# Patient Record
Sex: MALE | Race: BLACK OR AFRICAN AMERICAN | Employment: OTHER | URBAN - METROPOLITAN AREA
[De-identification: names, ages, dates, MRNs, and addresses within clinical notes are randomized per-mention and may not be internally consistent; named-entity substitution may affect disease eponyms.]

---

## 2019-09-13 ENCOUNTER — OFFICE VISIT (OUTPATIENT)
Dept: FAMILY MEDICINE CLINIC | Age: 59
End: 2019-09-13

## 2019-09-13 VITALS
WEIGHT: 209.8 LBS | RESPIRATION RATE: 18 BRPM | BODY MASS INDEX: 27.8 KG/M2 | HEIGHT: 73 IN | OXYGEN SATURATION: 97 % | HEART RATE: 70 BPM | DIASTOLIC BLOOD PRESSURE: 82 MMHG | TEMPERATURE: 98.2 F | SYSTOLIC BLOOD PRESSURE: 134 MMHG

## 2019-09-13 DIAGNOSIS — E66.3 OVERWEIGHT (BMI 25.0-29.9): ICD-10-CM

## 2019-09-13 DIAGNOSIS — M50.13 CERVICAL DISC DISORDER WITH RADICULOPATHY OF CERVICOTHORACIC REGION: ICD-10-CM

## 2019-09-13 DIAGNOSIS — Z87.891 FORMER SMOKER, STOPPED SMOKING IN DISTANT PAST: ICD-10-CM

## 2019-09-13 DIAGNOSIS — Z78.9 ALCOHOL USE: ICD-10-CM

## 2019-09-13 DIAGNOSIS — Z76.89 ENCOUNTER TO ESTABLISH CARE: Primary | ICD-10-CM

## 2019-09-13 DIAGNOSIS — M47.812 CERVICAL SPONDYLOSIS WITHOUT MYELOPATHY: ICD-10-CM

## 2019-09-13 DIAGNOSIS — M10.9 GOUT INVOLVING TOE OF RIGHT FOOT, UNSPECIFIED CAUSE, UNSPECIFIED CHRONICITY: ICD-10-CM

## 2019-09-13 RX ORDER — COLCHICINE 0.6 MG/1
0.6 TABLET ORAL DAILY
COMMUNITY
End: 2021-10-12 | Stop reason: ALTCHOICE

## 2019-09-13 NOTE — PROGRESS NOTES
[address] [facility] Note      Subjective:     Chief Complaint   Patient presents with    New Patient     establish care     Macey Roman is a 64y.o. year old male who presents for evaluation of the following:      Establishment of Care:  Previous PCP: Dr. Gutierrez Me  Patient Care Team:  Teresita Montoya MD as PCP - General (Family Practice)   Dentist- Name Marco Reveal- Name Unknown       PMH:   Gout:   Location Right big toe  Tx: colchicine prn, last used 1 year ago  Denies current pain  Lab Results   Component Value Date/Time    Uric acid 6.8 06/15/2013 09:55 PM       Cervical Disc Disease:   S/p formaniotice c6-c7 c7-T1 discectomy microlaminaotiy  Pain imrpvoedn   Residual weakness in right pink finger    Overweight:  Diet: balanced- eating vegetable most days  Activity: None   Last Weight Metrics:  Weight Loss Metrics 9/13/2019 2/5/2016 10/21/2014 10/14/2014 9/26/2014 10/14/2013 6/15/2013   Today's Wt 209 lb 12.8 oz 212 lb 4.9 oz 199 lb 199 lb 204 lb 204 lb 12.9 oz 199 lb 1.2 oz   BMI 27.68 kg/m2 28.02 kg/m2 26.98 kg/m2 26.98 kg/m2 26.92 kg/m2 27.03 kg/m2 26.27 kg/m2       Depression Screen:   3 most recent PHQ Screens 9/13/2019   Little interest or pleasure in doing things Not at all   Feeling down, depressed, irritable, or hopeless Not at all   Total Score PHQ 2 0       Smoker? Former, previous use 1 pack per week. quit 20 year  Alcohol Use: 3 beers per day. Sometimes more on the weekend  - has cut down to reduce liquor intake      Acute Concerns:  None      Social:   Works  Self employed as   Lives with wife. Has no children        Review of Systems   Pertinent positives and negative per HPI. All other systems  reviewed are negative for a Comprehensive ROS (10+).        Past Medical History:   Diagnosis Date    Gout         Social History     Socioeconomic History    Marital status:      Spouse name: Not on file    Number of children: Not on file    Years of education: Not on file    Highest education level: Not on file   Occupational History    Not on file   Social Needs    Financial resource strain: Not on file    Food insecurity:     Worry: Not on file     Inability: Not on file    Transportation needs:     Medical: Not on file     Non-medical: Not on file   Tobacco Use    Smoking status: Former Smoker     Packs/day: 0.50     Years: 12.00     Pack years: 6.00     Last attempt to quit: 10/14/1998     Years since quittin.9    Smokeless tobacco: Never Used   Substance and Sexual Activity    Alcohol use: Yes     Alcohol/week: 31.0 standard drinks     Types: 31 Cans of beer per week     Comment: around 3 per days     Drug use: Never    Sexual activity: Not Currently   Lifestyle    Physical activity:     Days per week: Not on file     Minutes per session: Not on file    Stress: Not on file   Relationships    Social connections:     Talks on phone: Not on file     Gets together: Not on file     Attends Yazidism service: Not on file     Active member of club or organization: Not on file     Attends meetings of clubs or organizations: Not on file     Relationship status: Not on file    Intimate partner violence:     Fear of current or ex partner: Not on file     Emotionally abused: Not on file     Physically abused: Not on file     Forced sexual activity: Not on file   Other Topics Concern    Not on file   Social History Narrative    Not on file       Family History   Problem Relation Age of Onset    Diabetes Mother     Hypertension Mother    [facility] Gout Mother     Cancer Father         PROSTATE    Diabetes Father     Anesth Problems Neg Hx        Current Outpatient Medications   Medication Sig    colchicine 0.6 mg tablet Take 0.6 mg by mouth daily.  oxyCODONE-acetaminophen (PERCOCET) 5-325 mg per tablet Take 1 Tab by mouth every six (6) hours as needed for Pain. Max Daily Amount: 4 Tabs.      No current facility-administered medications for this visit. Objective:     Vitals:    09/13/19 0837   BP: 134/82   Pulse: 70   Resp: 18   Temp: 98.2 °F (36.8 °C)   TempSrc: Oral   SpO2: 97%   Weight: 209 lb 12.8 oz (95.2 kg)   Height: 6' 1\" (1.854 m)       Physical Examination:  General: Alert, cooperative, no distress, appears stated age. Eyes: Conjunctivae clear. PERRL, EOMs intact. Ears: Normal external ear canals both ears. Nose: Nares normal.   Mouth/Throat: Lips, mucosa, and tongue normal.   Neck: Supple, symmetrical, trachea midline, no adenopathy. No thyroid enlargement/tenderness/nodules  Respiratory: Breathing comfortably, in no acute respiratory distress. Clear to auscultation bilaterally. Normal inspiratory and expiratory ratio. Cardiovascular: Regular rate and rhythm, S1, S2 normal, no murmur, click, rub or gallop.   -Extremities no edema. Pulses 2+ and symmetric radial   Abdomen: Soft, non-tender, not distended. Bowel sounds normal. No masses or organomegaly. MSK: Extremities normal, atraumatic, no effusion. Gait steady and unassisted. Back symmetric, no curvature. ROM normal. No CVA tenderness. Skin: Skin color, texture, turgor normal. No rashes or lesions on exposed skin. Lymph nodes: Cervical, supraclavicular nodes normal.  Neurologic: CNII-XII intact. Strength 5/5 grossly. Sensation and reflexes normal throughout. Psychiatric: Affect appropriate. Mood euthymic. Thoughts logical. Speech volume and speed normal      No visits with results within 3 Month(s) from this visit. Latest known visit with results is:   Admission on 10/21/2014, Discharged on 10/22/2014   Component Date Value Ref Range Status    HIV-1 screen 10/21/2014 NONREACTIVE  NR   Final    Comment: The test result is interpreted as NEGATIVE for HIV-1 and HIV-2 antibodies. However, this does not exclude possible infection with HIV. Follow CDC Guidelines to inform the test subject of the test result and its interpretation.                            This assay has not been evaluated for  screening, cord blood specimens, and individuals less than 15years of age.  Hepatitis C virus Ab 10/21/2014 0.11  Index Final    Hep C  virus Ab Interp. 10/21/2014 NEGATIVE     Final    Comment:      Index          Interpretation                           <0.80. .............. Allayne Ariadna Negative                           >0.79 and <1.00. ... Allayne Ariadna Allayne Airadna Equivocal                           >0.99 and <11.00. .. Allayne Ariadna Allayne Ariadna Low Positive                           >10.99. ............. Allayne Ariadna High Positive    Hep C  virus Ab comment 10/21/2014 Method used is [facility]. Final    Hepatitis B surface Ag 10/21/2014 <0.10  Index Final    Hep B surface Ag Interp. 10/21/2014 NEGATIVE     Final    Sodium 10/22/2014 137  136 - 145 mmol/L Final    Potassium 10/22/2014 4.4  3.5 - 5.1 mmol/L Final    Chloride 10/22/2014 102  97 - 108 mmol/L Final    CO2 10/22/2014 29  21 - 32 mmol/L Final    Anion gap 10/22/2014 6  5 - 15 mmol/L Final    Glucose 10/22/2014 119* 65 - 100 mg/dL Final    BUN 10/22/2014 9  6 - 20 MG/DL Final    Creatinine 10/22/2014 0.77  0.45 - 1.15 MG/DL Final    BUN/Creatinine ratio 10/22/2014 12  12 - 20   Final    GFR est AA 10/22/2014 >60  >60 ml/min/1.73m2 Final    GFR est non-AA 10/22/2014 >60  >60 ml/min/1.73m2 Final    Comment: Estimated GFR is calculated using the IDMS-traceable Modification of Diet in Renal Disease (MDRD) Study equation, reported for both  Americans (GFRAA) and non- Americans (GFRNA), and normalized to 1.73m2 body surface area. The physician must decide which value applies to the patient. The MDRD study equation should only be used in individuals age 25 or older. It has not been validated for the following: pregnant women, patients with serious comorbid conditions, or on certain medications, or persons with extremes of body size, muscle mass, or nutritional status.     Calcium 10/22/2014 8.7  8.5 - 10.1 MG/DL Final    WBC 10/22/2014 11.5* 4.1 - 11.1 K/uL Final    RBC 10/22/2014 5.34  4.10 - 5.70 M/uL Final    HGB 10/22/2014 14.5  12.1 - 17.0 g/dL Final    HCT 10/22/2014 44.8  36.6 - 50.3 % Final    MCV 10/22/2014 83.9  80.0 - 99.0 FL Final    MCH 10/22/2014 27.2  26.0 - 34.0 PG Final    MCHC 10/22/2014 32.4  30.0 - 36.5 g/dL Final    RDW 10/22/2014 12.4  11.5 - 14.5 % Final    PLATELET 43/31/4379 340  150 - 400 K/uL Final    NEUTROPHILS 10/22/2014 90* 32 - 75 % Final    LYMPHOCYTES 10/22/2014 8* 12 - 49 % Final    MONOCYTES 10/22/2014 2* 5 - 13 % Final    EOSINOPHILS 10/22/2014 0  0 - 7 % Final    BASOPHILS 10/22/2014 0  0 - 1 % Final    ABS. NEUTROPHILS 10/22/2014 10.4* 1.8 - 8.0 K/UL Final    ABS. LYMPHOCYTES 10/22/2014 0.9  0.8 - 3.5 K/UL Final    ABS. MONOCYTES 10/22/2014 0.2  0.0 - 1.0 K/UL Final    ABS. EOSINOPHILS 10/22/2014 0.0  0.0 - 0.4 K/UL Final    ABS. BASOPHILS 10/22/2014 0.0  0.0 - 0.1 K/UL Final           Assessment/ Plan:   Diagnoses and all orders for this visit:    1. Encounter to establish care    2. Cervical spondylosis without myelopathy    3. Cervical disc disorder with radiculopathy of cervicothoracic region    4. Overweight (BMI 25.0-29.9)    5. Gout involving toe of right foot, unspecified cause, unspecified chronicity    6. Alcohol use    7. Former smoker, stopped smoking in distant past      PMH reviewed per orders. Previous records requested/ reviewed. Diet and lifestyle modification encouraged for weight loss. Gout chronic. Continue colchicine- would refill when needed. Negative depression screen. Educated patient on red flag symptoms to warrant return to clinic or emergency room visit. I have discussed the diagnosis with the patient and the intended plan as seen in the above orders. The patient has been offered or received an after-visit summary and questions were answered concerning future plans.   I have discussed medication side effects and warnings with the patient as well.           Follow-up and Dispositions    · Return in about 6 months (around 3/13/2020) for Physical exam.         Signed,    Farideh Mccormick MD  9/13/2019

## 2019-09-13 NOTE — PATIENT INSTRUCTIONS
When You Are Overweight: Care Instructions  Your Care Instructions    If you're overweight, your doctor may recommend that you make changes in your eating and exercise habits. Being overweight can lead to serious health problems, such as high blood pressure, heart disease, type 2 diabetes, and arthritis, or it can make these problems worse. Eating a healthy diet and being more active can help you reach and stay at a healthy weight. You don't have to make huge changes all at once. Start by making small changes in your eating and exercise habits. To lose weight, you need to burn more calories than you take in. You can do this by eating healthy foods in reasonable amounts and becoming more active every day. Follow-up care is a key part of your treatment and safety. Be sure to make and go to all appointments, and call your doctor if you are having problems. It's also a good idea to know your test results and keep a list of the medicines you take. How can you care for yourself at home? · Improve your eating habits. You'll be more successful if you work on changing one eating habit at a time. All foods, if eaten in moderation, can be part of healthy eating. Remember to:  ? Eat a variety of foods from each food group. Include grains, vegetables, fruits, dairy, and protein foods. ? Limit foods high in fat, sugar, and calories. ? Eat slowly. And don't do anything else, such as watch TV, while you are eating. ? Pay attention to portion sizes. Put your food on a smaller plate. ? Plan your meals ahead of time. You'll be less likely to grab something that's not as healthy. · Get active. Regular activity can help you feel better, have more energy, and burn more calories. If you haven't been active, start slowly. Start with at least 30 minutes of moderate activity on most days of the week. Then gradually increase the amount of activity. Try for 60 or 90 minutes a day, at least 5 days a week.  There are a lot of ways to fit activity into your life. You can:  ? Walk or bike to the store. Or walk with a friend, or walk the dog.  ? Mow the lawn, rake leaves, shovel snow, or do some gardening. ? Use the stairs instead of the elevator, at least for a few floors. · Change your thinking. Your thoughts have a lot to do with how you feel and what you do. When you're trying to reach a healthy weight, changing how you think about certain things may help. Here are some ideas:  ? Don't compare yourself to others. Healthy bodies come in all shapes and sizes. ? Pay attention to how hungry or full you feel. When you eat, be aware of why you're eating and how much you're eating. ? Focus on improving your health instead of dieting. Dieting almost never works over the long term. · Ask your doctor about other health professionals who can help you reach a healthy weight. ? A dietitian can help you make healthy changes in your diet. ? An exercise specialist or  can help you develop a safe and effective exercise program.  ? A counselor or psychiatrist can help you cope with issues such as depression, anxiety, or family problems that can make it hard to focus on reaching a healthy weight. · Get support from your family, your doctor, your friends, a support groupand support yourself. Where can you learn more? Go to http://florencia-balta.info/. Enter Z683 in the search box to learn more about \"When You Are Overweight: Care Instructions. \"  Current as of: March 28, 2019  Content Version: 12.1  © 1372-3017 [employer]. Care instructions adapted under license by [employer] (which disclaims liability or warranty for this information). If you have questions about a medical condition or this instruction, always ask your healthcare professional. [address] any warranty or liability for your use of this information.

## 2019-09-13 NOTE — PROGRESS NOTES
Chief Complaint   Patient presents with    New Patient     establish care     1. Have you been to the ER, urgent care clinic since your last visit? Hospitalized since your last visit? No    2. Have you seen or consulted any other health care providers outside of the [address] [place] [address] since your last visit? Include any pap smears or colon screening.  No

## 2019-11-26 DIAGNOSIS — M47.812 CERVICAL SPONDYLOSIS WITHOUT MYELOPATHY: Primary | ICD-10-CM

## 2020-03-13 ENCOUNTER — OFFICE VISIT (OUTPATIENT)
Dept: FAMILY MEDICINE CLINIC | Age: 60
End: 2020-03-13

## 2020-03-13 VITALS
HEIGHT: 73 IN | SYSTOLIC BLOOD PRESSURE: 127 MMHG | OXYGEN SATURATION: 97 % | WEIGHT: 211.2 LBS | TEMPERATURE: 98 F | HEART RATE: 93 BPM | DIASTOLIC BLOOD PRESSURE: 84 MMHG | BODY MASS INDEX: 27.99 KG/M2 | RESPIRATION RATE: 16 BRPM

## 2020-03-13 DIAGNOSIS — E11.9 TYPE 2 DIABETES MELLITUS WITHOUT COMPLICATION, WITHOUT LONG-TERM CURRENT USE OF INSULIN (HCC): ICD-10-CM

## 2020-03-13 DIAGNOSIS — Z00.00 ENCOUNTER FOR WELLNESS EXAMINATION: Primary | ICD-10-CM

## 2020-03-13 DIAGNOSIS — Z11.59 NEED FOR HEPATITIS C SCREENING TEST: ICD-10-CM

## 2020-03-13 DIAGNOSIS — Z12.5 SCREENING PSA (PROSTATE SPECIFIC ANTIGEN): ICD-10-CM

## 2020-03-13 DIAGNOSIS — E66.3 OVERWEIGHT (BMI 25.0-29.9): ICD-10-CM

## 2020-03-13 DIAGNOSIS — Z80.42 FAMILY HISTORY OF PROSTATE CANCER: ICD-10-CM

## 2020-03-13 DIAGNOSIS — Z87.891 FORMER SMOKER: ICD-10-CM

## 2020-03-13 DIAGNOSIS — R73.09 ELEVATED GLUCOSE LEVEL: ICD-10-CM

## 2020-03-13 NOTE — PROGRESS NOTES
[facility] Note      Subjective:     Chief Complaint   Patient presents with    Complete Physical     annual      Jim Amaya is a 64y.o. year old male who presents for evaluation of the following:        Overweight:  Diet: balanced- eating vegetable most days  Activity: None   Last Weight Metrics:  Weight Loss Metrics 3/13/2020 9/13/2019 2/5/2016 10/21/2014 10/14/2014 9/26/2014 10/14/2013   Today's Wt 211 lb 3.2 oz 209 lb 12.8 oz 212 lb 4.9 oz 199 lb 199 lb 204 lb 204 lb 12.9 oz   BMI 27.86 kg/m2 27.68 kg/m2 28.02 kg/m2 26.98 kg/m2 26.98 kg/m2 26.92 kg/m2 27.03 kg/m2       Former Smoker: previous use 1 pack per week. quit 20 years ago  Alcohol Use: 3 beers per day. Sometimes more on the weekend  - has reduced liquor intake        Health Maintenance:   Health Maintenance   Topic Date Due    Shingrix Vaccine Age 49> (1 of 2) 08/06/2012    FOBT Q1Y Age 54-65  08/06/2012    Influenza Age 5 to Adult  06/28/2020 (Originally 8/1/2019)    DTaP/Tdap/Td series (4 - Td) 06/29/2022    Lipid Screen  03/13/2025    Hepatitis C Screening  Completed    Pneumococcal 0-64 years  Aged Out   [facility]    HIV or other STD testing: Declined  Domestic Violence Screen:   Abuse Screening Questionnaire 3/13/2020   Do you ever feel afraid of your partner? N   Are you in a relationship with someone who physically or mentally threatens you? N   Is it safe for you to go home? Y         Depression Screen:   3 most recent PHQ Screens 3/13/2020   Little interest or pleasure in doing things Not at all   Feeling down, depressed, irritable, or hopeless Not at all   Total Score PHQ 2 0         Smoker? Former quit more than 15 years ago     reports previously being sexually active. Prostate Check: Due  Charted family history of prostate cancer   Denies groin pain/pulling, penile bleeding/discharge, dysuria, nighttime urination.        Social:   Works  Self employed as   Lives with wife. Has no children    Patient Care Team:  Getachew Varner MD as PCP - General (Family Practice)  Getachew Varner MD as PCP - [facility] Empaneled Provider   Dentist- Name Yoni Ralphughter- Name Unknown         Review of Systems   Pertinent positives and negative per HPI. All other systems  reviewed are negative for a Comprehensive ROS (10+). Past Medical History:   Diagnosis Date    Gout         Social History     Socioeconomic History    Marital status:      Spouse name: Not on file    Number of children: Not on file    Years of education: Not on file    Highest education level: Not on file   Occupational History    Not on file   Social Needs    Financial resource strain: Not on file    Food insecurity     Worry: Not on file     Inability: Not on file    Transportation needs     Medical: Not on file     Non-medical: Not on file   Tobacco Use    Smoking status: Former Smoker     Packs/day: 0.50     Years: 12.00     Pack years: 6.00     Last attempt to quit: 10/14/1998     Years since quittin.4    Smokeless tobacco: Never Used   Substance and Sexual Activity    Alcohol use:  Yes     Alcohol/week: 31.0 standard drinks     Types: 31 Cans of beer per week     Comment: around 3 per days     Drug use: Never    Sexual activity: Not Currently   Lifestyle    Physical activity     Days per week: Not on file     Minutes per session: Not on file    Stress: Not on file   Relationships    Social connections     Talks on phone: Not on file     Gets together: Not on file     Attends Restoration service: Not on file     Active member of club or organization: Not on file     Attends meetings of clubs or organizations: Not on file     Relationship status: Not on file    Intimate partner violence     Fear of current or ex partner: Not on file     Emotionally abused: Not on file     Physically abused: Not on file     Forced sexual activity: Not on file   Other Topics Concern    Not on file Social History Narrative    Not on file       Family History   Problem Relation Age of Onset    Diabetes Mother     Hypertension Mother    [employer] Gout Mother     Cancer Father         PROSTATE    Diabetes Father     Anesth Problems Neg Hx        Current Outpatient Medications   Medication Sig    colchicine 0.6 mg tablet Take 0.6 mg by mouth daily. No current facility-administered medications for this visit. Objective:     Vitals:    03/13/20 1414   BP: 127/84   Pulse: 93   Resp: 16   Temp: 98 °F (36.7 °C)   TempSrc: Oral   SpO2: 97%   Weight: 211 lb 3.2 oz (95.8 kg)   Height: 6' 1\" (1.854 m)       Physical Examination:  General: Alert, cooperative, no distress, appears stated age. Eyes: Conjunctivae clear. PERRL, EOMs intact. Ears: Normal external ear canals both ears. Nose: Nares normal.   Mouth/Throat: Lips, mucosa, and tongue normal.   Neck: Supple, symmetrical, trachea midline, no adenopathy. No thyroid enlargement/tenderness/nodules  Respiratory: Breathing comfortably, in no acute respiratory distress. Clear to auscultation bilaterally. Normal inspiratory and expiratory ratio. Cardiovascular: Regular rate and rhythm, S1, S2 normal, no murmur, click, rub or gallop.   -Extremities no edema. Pulses 2+ and symmetric radial   Abdomen: Soft, non-tender, not distended. Bowel sounds normal. No masses or organomegaly. MSK: Extremities normal, atraumatic, no effusion. Gait steady and unassisted. Back symmetric, no curvature. ROM normal. No CVA tenderness. Skin: Skin color, texture, turgor normal. No rashes or lesions on exposed skin. Lymph nodes: Cervical, supraclavicular nodes normal.  Neurologic: CNII-XII intact. Strength 5/5 grossly. Sensation and reflexes normal throughout. Psychiatric: Affect appropriate. Mood euthymic. Thoughts logical. Speech volume and speed normal      No visits with results within 3 Month(s) from this visit.    Latest known visit with results is:   Admission on 10/21/2014, Discharged on 10/22/2014   Component Date Value Ref Range Status    HIV-1 screen 10/21/2014 NONREACTIVE  NR   Final    Comment: The test result is interpreted as NEGATIVE for HIV-1 and HIV-2 antibodies. However, this does not exclude possible infection with HIV. Follow CDC Guidelines to inform the test subject of the test result and its interpretation. This assay has not been evaluated for  screening, cord blood specimens, and individuals less than 15years of age.  Hepatitis C virus Ab 10/21/2014 0.11  Index Final    Hep C  virus Ab Interp. 10/21/2014 NEGATIVE     Final    Comment:      Index          Interpretation                           <0.80. .............. Berny Shillings Negative                           >0.79 and <1.00. ... Berny Shillings Berny Shillings Equivocal                           >0.99 and <11.00. .. Berny Shillings Berny Shillings Low Positive                           >10.99. ............. Berny Shillings High Positive    Hep C  virus Ab comment 10/21/2014 Method used is [facility]. Final    Hepatitis B surface Ag 10/21/2014 <0.10  Index Final    Hep B surface Ag Interp. 10/21/2014 NEGATIVE     Final    Sodium 10/22/2014 137  136 - 145 mmol/L Final    Potassium 10/22/2014 4.4  3.5 - 5.1 mmol/L Final    Chloride 10/22/2014 102  97 - 108 mmol/L Final    CO2 10/22/2014 29  21 - 32 mmol/L Final    Anion gap 10/22/2014 6  5 - 15 mmol/L Final    Glucose 10/22/2014 119* 65 - 100 mg/dL Final    BUN 10/22/2014 9  6 - 20 MG/DL Final    Creatinine 10/22/2014 0.77  0.45 - 1.15 MG/DL Final    BUN/Creatinine ratio 10/22/2014 12  12 - 20   Final    GFR est AA 10/22/2014 >60  >60 ml/min/1.73m2 Final    GFR est non-AA 10/22/2014 >60  >60 ml/min/1.73m2 Final    Comment: Estimated GFR is calculated using the IDMS-traceable Modification of Diet in Renal Disease (MDRD) Study equation, reported for both  Americans (GFRAA) and non- Americans (GFRNA), and normalized to 1.73m2 body surface area.  The physician must decide which value applies to the patient. The MDRD study equation should only be used in individuals age 25 or older. It has not been validated for the following: pregnant women, patients with serious comorbid conditions, or on certain medications, or persons with extremes of body size, muscle mass, or nutritional status.  Calcium 10/22/2014 8.7  8.5 - 10.1 MG/DL Final    WBC 10/22/2014 11.5* 4.1 - 11.1 K/uL Final    RBC 10/22/2014 5.34  4.10 - 5.70 M/uL Final    HGB 10/22/2014 14.5  12.1 - 17.0 g/dL Final    HCT 10/22/2014 44.8  36.6 - 50.3 % Final    MCV 10/22/2014 83.9  80.0 - 99.0 FL Final    MCH 10/22/2014 27.2  26.0 - 34.0 PG Final    MCHC 10/22/2014 32.4  30.0 - 36.5 g/dL Final    RDW 10/22/2014 12.4  11.5 - 14.5 % Final    PLATELET 36/02/5071 228  150 - 400 K/uL Final    NEUTROPHILS 10/22/2014 90* 32 - 75 % Final    LYMPHOCYTES 10/22/2014 8* 12 - 49 % Final    MONOCYTES 10/22/2014 2* 5 - 13 % Final    EOSINOPHILS 10/22/2014 0  0 - 7 % Final    BASOPHILS 10/22/2014 0  0 - 1 % Final    ABS. NEUTROPHILS 10/22/2014 10.4* 1.8 - 8.0 K/UL Final    ABS. LYMPHOCYTES 10/22/2014 0.9  0.8 - 3.5 K/UL Final    ABS. MONOCYTES 10/22/2014 0.2  0.0 - 1.0 K/UL Final    ABS. EOSINOPHILS 10/22/2014 0.0  0.0 - 0.4 K/UL Final    ABS. BASOPHILS 10/22/2014 0.0  0.0 - 0.1 K/UL Final           Assessment/ Plan:   Diagnoses and all orders for this visit:    1. Encounter for wellness examination  -     METABOLIC PANEL, COMPREHENSIVE  -     CBC WITH AUTOMATED DIFF  -     LIPID PANEL    2. Former smoker    3. Overweight (BMI 25.0-29.9)    4. Need for hepatitis C screening test  -     HEPATITIS C AB    5. Screening PSA (prostate specific antigen)  -     PSA, DIAGNOSTIC (PROSTATE SPECIFIC AG)    6.  Family history of prostate cancer  -     PSA, DIAGNOSTIC (PROSTATE SPECIFIC AG)    Other orders  -     CVD REPORT        Relevant vaccine, cancer screening and other health maintenance reviewed and updated per orders. Labs to eval end organ function and etiology of chronic/acute concerns. Relevant vaccine, cancer screening and other health maintenance reviewed and updated per orders. Diet and lifestyle modification encouraged for weight loss. Educated patient on red flag symptoms to warrant return to clinic or emergency room visit. I have discussed the diagnosis with the patient and the intended plan as seen in the above orders. The patient has been offered or received an after-visit summary and questions were answered concerning future plans. I have discussed medication side effects and warnings with the patient as well. Follow-up and Dispositions    · Return in about 1 year (around 3/13/2021) for Follow Up.        Signed,    Real Baptiste MD  3/13/2020

## 2020-03-13 NOTE — PROGRESS NOTES
Chief Complaint   Patient presents with    Complete Physical     annual      1. Have you been to the ER, urgent care clinic since your last visit? Hospitalized since your last visit? No    2. Have you seen or consulted any other health care providers outside of the [address] [place] [address] since your last visit? Include any pap smears or colon screening.  No

## 2020-03-13 NOTE — PATIENT INSTRUCTIONS
Well Visit, Men 48 to 72: Care Instructions Your Care Instructions Physical exams can help you stay healthy. Your doctor has checked your overall health and may have suggested ways to take good care of yourself. He or she also may have recommended tests. At home, you can help prevent illness with healthy eating, regular exercise, and other steps. Follow-up care is a key part of your treatment and safety. Be sure to make and go to all appointments, and call your doctor if you are having problems. It's also a good idea to know your test results and keep a list of the medicines you take. How can you care for yourself at home? · Reach and stay at a healthy weight. This will lower your risk for many problems, such as obesity, diabetes, heart disease, and high blood pressure. · Get at least 30 minutes of exercise on most days of the week. Walking is a good choice. You also may want to do other activities, such as running, swimming, cycling, or playing tennis or team sports. · Do not smoke. Smoking can make health problems worse. If you need help quitting, talk to your doctor about stop-smoking programs and medicines. These can increase your chances of quitting for good. · Protect your skin from too much sun. When you're outdoors from 10 a.m. to 4 p.m., stay in the shade or cover up with clothing and a hat with a wide brim. Wear sunglasses that block UV rays. Even when it's cloudy, put broad-spectrum sunscreen (SPF 30 or higher) on any exposed skin. · See a dentist one or two times a year for checkups and to have your teeth cleaned. · Wear a seat belt in the car. Follow your doctor's advice about when to have certain tests. These tests can spot problems early. · Cholesterol. Your doctor will tell you how often to have this done based on your overall health and other things that can increase your risk for heart attack and stroke. · Blood pressure.  Have your blood pressure checked during a routine doctor visit. Your doctor will tell you how often to check your blood pressure based on your age, your blood pressure results, and other factors. · Prostate exam. Talk to your doctor about whether you should have a blood test (called a PSA test) for prostate cancer. Experts recommend that you discuss the benefits and risks of the test with your doctor before you decide whether to have this test. 
· Diabetes. Ask your doctor whether you should have tests for diabetes. · Vision. Some experts recommend that you have yearly exams for glaucoma and other age-related eye problems starting at age 48. · Hearing. Tell your doctor if you notice any change in your hearing. You can have tests to find out how well you hear. · Colorectal cancer. Your risk for colorectal cancer gets higher as you get older. Some experts say that adults should start regular screening at age 48 and stop at age 76. Others say to start before age 48 or continue after age 76. Talk with your doctor about your risk and when to start and stop screening. · Heart attack and stroke risk. At least every 4 to 6 years, you should have your risk for heart attack and stroke assessed. Your doctor uses factors such as your age, blood pressure, cholesterol, and whether you smoke or have diabetes to show what your risk for a heart attack or stroke is over the next 10 years. · Abdominal aortic aneurysm. Ask your doctor whether you should have a test to check for an aneurysm. You may need a test if you ever smoked or if your parent, brother, sister, or child has had an aneurysm. When should you call for help? Watch closely for changes in your health, and be sure to contact your doctor if you have any problems or symptoms that concern you. Where can you learn more? Go to http://florencia-balta.info/ Enter H077 in the search box to learn more about \"Well Visit, Men 48 to 72: Care Instructions. \" Current as of: August 21, 2019Content Version: 12.4 © 0283-7297 [employer]. Care instructions adapted under license by [employer] (which disclaims liability or warranty for this information). If you have questions about a medical condition or this instruction, always ask your healthcare professional. [address] any warranty or liability for your use of this information.

## 2020-03-14 LAB
ALBUMIN SERPL-MCNC: 4.8 G/DL (ref 3.8–4.9)
ALBUMIN/GLOB SERPL: 2.1 {RATIO} (ref 1.2–2.2)
ALP SERPL-CCNC: 70 IU/L (ref 39–117)
ALT SERPL-CCNC: 69 IU/L (ref 0–44)
AST SERPL-CCNC: 28 IU/L (ref 0–40)
BASOPHILS # BLD AUTO: 0 X10E3/UL (ref 0–0.2)
BASOPHILS NFR BLD AUTO: 0 %
BILIRUB SERPL-MCNC: 0.7 MG/DL (ref 0–1.2)
BUN SERPL-MCNC: 12 MG/DL (ref 6–24)
BUN/CREAT SERPL: 13 (ref 9–20)
CALCIUM SERPL-MCNC: 10.1 MG/DL (ref 8.7–10.2)
CHLORIDE SERPL-SCNC: 100 MMOL/L (ref 96–106)
CHOLEST SERPL-MCNC: 168 MG/DL (ref 100–199)
CO2 SERPL-SCNC: 24 MMOL/L (ref 20–29)
CREAT SERPL-MCNC: 0.94 MG/DL (ref 0.76–1.27)
EOSINOPHIL # BLD AUTO: 0.1 X10E3/UL (ref 0–0.4)
EOSINOPHIL NFR BLD AUTO: 1 %
ERYTHROCYTE [DISTWIDTH] IN BLOOD BY AUTOMATED COUNT: 12.4 % (ref 11.6–15.4)
GLOBULIN SER CALC-MCNC: 2.3 G/DL (ref 1.5–4.5)
GLUCOSE SERPL-MCNC: 239 MG/DL (ref 65–99)
HCT VFR BLD AUTO: 43.4 % (ref 37.5–51)
HCV AB S/CO SERPL IA: <0.1 S/CO RATIO (ref 0–0.9)
HDLC SERPL-MCNC: 45 MG/DL
HGB BLD-MCNC: 14.6 G/DL (ref 13–17.7)
IMM GRANULOCYTES # BLD AUTO: 0 X10E3/UL (ref 0–0.1)
IMM GRANULOCYTES NFR BLD AUTO: 0 %
INTERPRETATION, 910389: NORMAL
LDLC SERPL CALC-MCNC: 106 MG/DL (ref 0–99)
LYMPHOCYTES # BLD AUTO: 2 X10E3/UL (ref 0.7–3.1)
LYMPHOCYTES NFR BLD AUTO: 30 %
MCH RBC QN AUTO: 27.6 PG (ref 26.6–33)
MCHC RBC AUTO-ENTMCNC: 33.6 G/DL (ref 31.5–35.7)
MCV RBC AUTO: 82 FL (ref 79–97)
MONOCYTES # BLD AUTO: 0.6 X10E3/UL (ref 0.1–0.9)
MONOCYTES NFR BLD AUTO: 9 %
NEUTROPHILS # BLD AUTO: 4 X10E3/UL (ref 1.4–7)
NEUTROPHILS NFR BLD AUTO: 60 %
PLATELET # BLD AUTO: 275 X10E3/UL (ref 150–450)
POTASSIUM SERPL-SCNC: 4.2 MMOL/L (ref 3.5–5.2)
PROT SERPL-MCNC: 7.1 G/DL (ref 6–8.5)
RBC # BLD AUTO: 5.29 X10E6/UL (ref 4.14–5.8)
SODIUM SERPL-SCNC: 141 MMOL/L (ref 134–144)
TRIGL SERPL-MCNC: 86 MG/DL (ref 0–149)
VLDLC SERPL CALC-MCNC: 17 MG/DL (ref 5–40)
WBC # BLD AUTO: 6.6 X10E3/UL (ref 3.4–10.8)

## 2020-03-15 PROBLEM — Z87.891 FORMER SMOKER: Status: ACTIVE | Noted: 2020-03-15

## 2020-03-15 PROBLEM — E66.3 OVERWEIGHT (BMI 25.0-29.9): Status: ACTIVE | Noted: 2020-03-15

## 2020-03-15 NOTE — PROGRESS NOTES
Notify Patient and call to add on A1C and PSA:  Most of your results are normal. Your blood sugar was high, in the diabetes range. Since you do not have any symptoms of diabetes at this time we will repeat this test before providing the formal diagnosis. Return to clinic franci provide a new blood sample to evaluate this. I would suggest you change your diet to exclude processed foods such as deli meat and hotdogs. You should also avoid bread, crackers, cakes, pasta, and cookies. Try replacing them with whole foods, like fruits and vegetables. Thank you for allowing me to participate in your care.

## 2020-03-16 ENCOUNTER — TELEPHONE (OUTPATIENT)
Dept: FAMILY MEDICINE CLINIC | Age: 60
End: 2020-03-16

## 2020-03-16 LAB — SPECIMEN STATUS REPORT, ROLRST: NORMAL

## 2020-03-16 NOTE — TELEPHONE ENCOUNTER
Call placed to [facility] spoke with [facility] requested to add testing to current specimen.

## 2020-03-16 NOTE — TELEPHONE ENCOUNTER
----- Message from John Sánchez MD sent at 3/15/2020  4:27 PM EDT -----  Regarding: can you call to add on PSA lab

## 2020-03-17 LAB
EST. AVERAGE GLUCOSE BLD GHB EST-MCNC: 214 MG/DL
GLUCOSE SERPL-MCNC: 244 MG/DL (ref 65–99)
HBA1C MFR BLD: 9.1 % (ref 4.8–5.6)
PSA SERPL-MCNC: 0.8 NG/ML (ref 0–4)
SPECIMEN STATUS REPORT, ROLRST: NORMAL

## 2020-03-17 NOTE — PROGRESS NOTES
Discussed with patient via telephone. Provided diabetes education including diet modification, annual eye exam, annual renal testing.   Will refer to diabetes education, start metformin, prescribed glucometer, and referred to ophthalmologist.

## 2020-03-18 RX ORDER — METFORMIN HYDROCHLORIDE 500 MG/1
TABLET, EXTENDED RELEASE ORAL
Qty: 180 TAB | Refills: 1 | Status: SHIPPED | OUTPATIENT
Start: 2020-03-18 | End: 2020-06-19

## 2020-03-18 RX ORDER — LANCETS
EACH MISCELLANEOUS
Qty: 1 EACH | Refills: 11 | Status: SHIPPED | OUTPATIENT
Start: 2020-03-18 | End: 2021-06-21

## 2020-03-18 RX ORDER — INSULIN PUMP SYRINGE, 3 ML
EACH MISCELLANEOUS
Qty: 1 KIT | Refills: 0 | Status: SHIPPED | OUTPATIENT
Start: 2020-03-18 | End: 2021-10-12 | Stop reason: ALTCHOICE

## 2020-05-22 ENCOUNTER — VIRTUAL VISIT (OUTPATIENT)
Dept: DIABETES SERVICES | Age: 60
End: 2020-05-22

## 2020-05-22 DIAGNOSIS — E11.9 TYPE 2 DIABETES MELLITUS WITHOUT COMPLICATION, WITHOUT LONG-TERM CURRENT USE OF INSULIN (HCC): ICD-10-CM

## 2020-05-22 NOTE — PROGRESS NOTES
[facility]  Diabetes Self-Management Education   Pre-program Assessment    Reason for Referral: DSMES  Referral Source: Brennarachel Palma Patient responses (5/22/2020)   A1c  (Goal: 7%)   Recent value:   Lab Results   Component Value Date/Time    Hemoglobin A1c 9.1 (H) 03/13/2020 03:05 PM        Healthy Eating     24-hour Dietary Recall:  Breakfast: sausage and egg biscuit  Lunch: crab salad with cucumber, tomato, carrots, ranch dressing, chips  Dinner: shrimp stir calvin with sausage, rice, broccoli   Snacks: granola bars  Beverages: water, Ocean Spray sugar-free drinks  Alcohol: 3 beers    Stage of change: Action     Being Active     Physical Activity Vital Sign:  How many days during the past week have you performed physical activity where your heart beats faster and your breathing is harder than normal for 30 minutes or more?  0 days    How many days in a typical week do you perform activity such as this?  0 days    Stage of change: Action     Monitoring Do you monitor your blood sugar? Yes    How often do you monitor? <1x/day    What are the range of readings? 107-136 mg/dL      Do you know your last A1c measurement? No    Do you know the meaning of the A1c? No    Stage of change: Action     Taking Medications Medication Management:  Do you understand what your diabetes medications do? not taking DM medications at this time    Can you afford your diabetes medications? N/A    How often do you miss doses of your diabetes medications? Not taking diabetes medication    Current dosing:   Key Antihyperglycemic Medications             metFORMIN ER (GLUCOPHAGE XR) 500 mg tablet Take 1 tab daily for 1 week then take 2 tabs daily. Blood Pressure Management:  Key ACE/ARB Medications     Patient is on no ACE or ARB meds. Lipid Management:  Key Antihyperlipidemia Meds     The patient is on no antihyperlipidemia meds.           Clot Prevention:  Key Anti-Platelet Anticoagulant Meds     The patient is on no antiplatelet meds or anticoagulants. Stage of change: Action     Healthy Coping Diabetes Skills, Confidence and Preparedness Index: Total score: 5.3  Skills: 4.1  Confidence: 5.7  Preparedness: 6.2    Stage of change: Action     Reducing Risks Vaccines:  Influenza: There is no immunization history for the selected administration types on file for this patient. Pneumococcal: There is no immunization history for the selected administration types on file for this patient. Hepatitis: There is no immunization history for the selected administration types on file for this patient. Examinations:  Diabetic Foot and Eye Exam HM Status   Topic Date Due    Diabetic Foot Care  08/06/1972    Eye Exam  08/06/1972        Dental exam: Last appointment was: 6 months ago    Foot exam: DUE    Heart Protection:  BP Readings from Last 2 Encounters:   03/13/20 127/84   09/13/19 134/82        Lab Results   Component Value Date/Time    LDL, calculated 106 (H) 03/13/2020 03:05 PM        Kidney Protection:  No results found for: MCACR, MCA1, MCA2, MCA3, MCAU, MCAU2, MCALPOCT    Patient-reported diabetes complications:  none    Stage of change: Action     Problem Solving Hypoglycemia Management:  What are signs and symptoms of hypoglycemia that you experience? Pt reported being unaware of s/s of hypoglycemia    How do you prevent hypoglycemia? Pt reported being unaware of how to prevent hypoglycemia    How do you treat hypoglycemia? Pt reported being unaware of how to treat hypoglycemia    Hyperglycemia Management:  What are signs and symptoms of hyperglycemia that you experience? Pt reported being unaware of s/s of hyperglycemia    How can you prevent hyperglycemia? Pt reported being unaware of how to prevent hyperglycemia    Sick Day Management:  What do you do differently on sick days?  Pt reported being unaware of self-management on sick days    Pattern Management:  Do you notice blood glucose patterns when you look at the readings in your meter or logbook? No    How do you use the blood glucose readings from your meter or logbook? Pt reported being unaware of pattern management skills    Stage of change: Action   Note: Content derived from the American Association of Diabetes Educators' Diabetes Education Curriculum: A Guide to Successful Self-Management (2nd edition)      Diabetes Educator Recommendations:  - Address diabetes self-care behaviors through education and support using AADE7 Curriculum. Pt to attend weekly individual sessions on reducing risks, healthy eating, being active, monitoring, taking medications, healthy coping and problem solving.  - Pt to follow up with provider on the following: none at this time    Diabetes Self-Management Education Follow-up Visit: 1 week      [facility] Emergency Adaptations for Telehealth:    Anne Marie Oleary is a 64 y.o. male being evaluated through a synchronous (real-time) audio-video technology platform, as a substitution for an in-person encounter, to address the healthcare issues mentioned above. A caregiver was present when appropriate. The patient and/or his healthcare decision maker, is aware that this patient-initiated, Telehealth encounter on 5/22/2020 is a billable service, with coverage as determined by his insurance carrier. He is aware that he may receive a bill and has provided verbal consent to proceed: Yes. This teleHealth encounter occurred during the COVID-19 pandemic and public health emergency. Evaluation of the following organ systems was limited: Vitals/Constitutional/EENT/Resp/CV/GI//MS/Neuro/Skin/Heme-Lymph-Imm.   Pursuant to the emergency declaration under the [facility], [facility] waiver authority and the [employer] Act, this Virtual Visit was conducted with patient's (and/or legal guardian's) consent, to reduce the risk of exposure to COVID-19 and provide necessary medical care. --Tiffanie Santana RN on 5/22/2020 at 5:03 PM    An electronic signature was used to authenticate this note. I was in the office for the appointment and time spent: 58 minutes    Diabetes Skills, Confidence & Preparedness Index (SCPI) ©  Thank you for completing the Skills, Confidence & Preparedness Index! Below are your scores. All scales and questions are out of 7. If you would like these results emailed, please enter your email address along with some identifying patient information. Email: Patient Identifier:    Overall SCPI score: 5.3 Skills Score: 4.1  Low: Healthy Eating(Q1),Problem Solving(Q6) Confidence Score: 5.7  Low: Reducing Risks(Q3) Preparedness Score: 6.2  Low: Healthy Eating(Q1),Being Active(Q2),Healthy Coping(Q3),Problem RDGHIBC(W9)   Healthy Eating Score: 5.0  Low: Skills(Q1) Taking Medication Score: N/A Blood Sugar Monitoring Score: 5.8  Low: Skills(Q3),Skills(Q8) Reducing Risks Score: 4.3  Low: Skills(Q5),Confidence(Q3)   Problem Solving Score: 4.7  Low: Skills(Q6) Healthy Coping Score: 6.0  Low: Skills(Q7),Confidence(Q2),Preparedness(Q3) Being Active Score: 6.0  Low: Confidence(Q5),Preparedness(Q2)   Skills/Knowledge Questions   1. I know how to plan meals that have the best balance between carbohydrates, proteins and vegetables. 2   2. I know how my diabetes medications (pills, injectables and/or insulin) work in my body. 0   3. I know when to check my blood sugar if I want to see how my body responded to a meal. 5   4. I know when to check my blood sugars to determine if my medication or insulin doses are correct. 0   5. I know what to do to prevent a low blood sugar when I exercise (either before, during, or after). 4   6. When I am sick, I know what to do differently with my diabetes management. 2   7. I know how stress can affect my diabetes management. 6   8.  When I look at my blood sugars over a given week, I can explain what my blood sugar pattern is. 5   9. I know what my target levels are for A1c, blood pressure and cholesterol. 5   Confidence Questions   1. I am confident that I can plan balanced meals and snacks. 6   2. I am confident that I can manage my stress. 6   3. I am confident that I can prevent a low blood sugar during or after exercise. 4   4. I am confident that the next time I eat out, I will be able to choose foods that best keep my blood sugars in target. 6   5. I am confident I can include exercise into my schedule. 6   6. I am confident that I can use my daily blood sugars to adjust my diet, my activity, and/or my insulin. 6   7. When something out of my normal routine happens, I am confident that I can problem-solve and keep my diabetes on track. 6   Preparedness Questions   1. Within the next month, I will begin to eat more balanced meals and snacks. 6   2. Within the next month, I will choose an exercise activity and I will start fitting it into my schedule. 6   3. Within the next month, I will make a list of stress management options that work for me. 6   4. Within the next month, I will consistently plan ahead to prevent low blood sugars. 0   5. Within the next month, I will start adjusting my insulin doses on my own. 0   6. Within the next month, I will begin making changes to my diabetes management based on my daily blood sugars (eg - eating, activity and/or insulin). 8   7. Within the next month, I will begin making changes to my diabetes management to meet my overall goals (eg - eating, activity and/or insulin).

## 2020-05-29 ENCOUNTER — VIRTUAL VISIT (OUTPATIENT)
Dept: DIABETES SERVICES | Age: 60
End: 2020-05-29

## 2020-05-29 DIAGNOSIS — E11.9 TYPE 2 DIABETES MELLITUS WITHOUT COMPLICATION, WITHOUT LONG-TERM CURRENT USE OF INSULIN (HCC): ICD-10-CM

## 2020-05-29 NOTE — PROGRESS NOTES
[employer] [facility]  Diabetes Self-Management Education   Education and Goal Plan for Session #1    AADE7 Self-Care Behaviors:  Behavior Education Completed (5/29/2020)   Healthy Eating   - Impact of food on blood glucose levels  - Balanced plate  - Heart healthy fats     Being Active   - Effects of exercise on blood glucose     Monitoring     - ADA blood glucose targets  - Frequency of monitoring  - Blood glucose schedule  - A1c interpretation  - Logging blood glucose results   Taking Medications - Medication review  - Medication schedule   Healthy Coping - Stress management  - Obtaining support   Reducing Risks - Prevention of influenza  - Foot care and foot exam  - Dilated eye exam  - Prevention of pneumonia  - Prevention of hepatitis B  - Dental care and dental exam  - Hgb A1c target  - Long-term complications   Problem Solving Not addressed during this session. Note: Content derived from the American Association of Diabetes Educators' Diabetes Education Curriculum: A Guide to Successful Self-Management (2nd edition)      Diabetes Educator Recommendations:  - Continue addressing diabetes self-care behaviors through education and support in AADE7 Curriculum individual sessions on reducing risks, healthy eating, being active, monitoring, taking medications, healthy coping and problem solving.  - Continue practicing knowledge and skills relating to reducing risks to improve diabetes self-management. - Patient's Identified SMART Goal(s): - Take medications as prescribed for 1 week  - Pt to follow up with provider on the following: Recommended immunizations    Diabetes Self-Management Education Follow-up Visit: 1 week      [facility] Emergency Adaptations for Telehealth:    Elizabeth Bui is a 64 y.o. male being evaluated through a synchronous (real-time) audio-video technology platform, as a substitution for an in-person encounter, to address the healthcare issues mentioned above.  I was in the office. The patient was at home. A caregiver was present when appropriate. The patient and/or his healthcare decision maker, is aware that this patient-initiated, Telehealth encounter on 5/29/2020 is a billable service, with coverage as determined by his insurance carrier. He is aware that he may receive a bill and has provided verbal consent to proceed: Yes. This teleHealth encounter occurred during the COVID-19 pandemic and public health emergency. Evaluation of the following organ systems was limited: Vitals/Constitutional/EENT/Resp/CV/GI//MS/Neuro/Skin/Heme-Lymph-Imm. Pursuant to the emergency declaration under the [address] [place] [address], [address] [place] [address] authority and the [employer] Act, this Virtual Visit was conducted with patient's (and/or legal guardian's) consent, to reduce the risk of exposure to COVID-19 and provide necessary medical care. --Ana Pritchard RN on 5/29/2020 at 4:59 PM    An electronic signature was used to authenticate this note.  I was in the office for the appointment and time spent: 62 minutes

## 2020-06-19 DIAGNOSIS — E11.9 TYPE 2 DIABETES MELLITUS WITHOUT COMPLICATION, WITHOUT LONG-TERM CURRENT USE OF INSULIN (HCC): Primary | ICD-10-CM

## 2020-06-19 RX ORDER — METFORMIN HYDROCHLORIDE 500 MG/1
500 TABLET ORAL 2 TIMES DAILY WITH MEALS
Qty: 180 TAB | Refills: 1 | Status: SHIPPED | OUTPATIENT
Start: 2020-06-19 | End: 2021-10-12 | Stop reason: ALTCHOICE

## 2020-07-24 ENCOUNTER — VIRTUAL VISIT (OUTPATIENT)
Dept: DIABETES SERVICES | Age: 60
End: 2020-07-24

## 2020-07-24 DIAGNOSIS — E11.9 TYPE 2 DIABETES MELLITUS WITHOUT COMPLICATION, WITHOUT LONG-TERM CURRENT USE OF INSULIN (HCC): ICD-10-CM

## 2020-07-24 NOTE — PROGRESS NOTES
[address] [place] [address] for Diabetes Health  Diabetes Self-Management Education   Education and Goal Plan for Session #2A    AADE7 Self-Care Behaviors:  Behavior Education Completed (7/24/2020)   Healthy Eating   - Impact of food on blood glucose levels  - Food sources of carbohydrates  - Meal size and portions  - Reading food labels  - Balanced plate  - Meal and snack timing  - Carbohydrate counting   - Importance of a variety of foods  - Heart healthy fats  - Reducing sodium  - Importance of not skipping meals  - Eating breakfast  - Controlling portions of carbohydrates  - Reducing sugar-sweetened beverages  - Improving water intake  - Comparing food choices  - Alcohol and diabetes  - Meal consistency     Being Active   Not addressed during this session. Monitoring     - Frequency of monitoring  - Blood glucose schedule  - Monitoring blood glucose trends per meter  - Logging blood glucose results   Taking Medications - Medication review   Healthy Coping - Obtaining support   Reducing Risks Education delivered in previous session. Problem Solving - Hypoglycemia signs/symptoms  - Hypoglycemia treatment: Rule of 15   Note: Content derived from the American Association of Diabetes Educators' Diabetes Education Curriculum: A Guide to Successful Self-Management (2nd edition)         Diabetes Educator Recommendations:     - Continue addressing diabetes self-care behaviors through education and support in AADE7 Curriculum individual sessions on reducing risks, healthy eating, being active, monitoring, taking medications, healthy coping and problem solving.     - Continue practicing knowledge and skills relating to healthy eating to improve diabetes self-management. - Patient's Identified SMART Goal(s): - Practice healthy eating self-care behavior by following ADA guidelines for healthy meals each day  over the next week.      - Pt to follow up with provider on the following: none at this time     Diabetes Self-Management Education Follow-up Visit: 2/9/07    [id number] Emergency Adaptations for Telehealth:    Jerardo Gaytan is a 64 y.o. male being evaluated through a synchronous (real-time) audio-video technology platform, as a substitution for an in-person encounter, to address the healthcare issues mentioned above. I was in the office. The patient was at home. A caregiver was present when appropriate. The patient and/or his healthcare decision maker, is aware that this patient-initiated, Telehealth encounter on 7/24/2020 is a billable service, with coverage as determined by his insurance carrier. He is aware that he may receive a bill and has provided verbal consent to proceed: Yes. This telehealth encounter occurred during the COVID-19 pandemic and public health emergency. Evaluation of the following organ systems was limited: Vitals/Constitutional/EENT/Resp/CV/GI//MS/Neuro/Skin/Heme-Lymph-Imm. Pursuant to the emergency declaration under the [address] [place] [address], [address] [place] [address] authority and the [employer] and [employer] Act, this Virtual Visit was conducted with patient's (and/or legal guardian's) consent, to reduce the risk of exposure to COVID-19 and provide necessary medical care. --Guerda Rivera, RN on 7/24/2020 at 4:18 PM    An electronic signature was used to authenticate this note.  I was in the office for the appointment and time spent: 72 minutes

## 2020-07-31 ENCOUNTER — VIRTUAL VISIT (OUTPATIENT)
Dept: FAMILY MEDICINE CLINIC | Age: 60
End: 2020-07-31

## 2020-07-31 DIAGNOSIS — M54.50 ACUTE RIGHT-SIDED LOW BACK PAIN WITHOUT SCIATICA: ICD-10-CM

## 2020-07-31 DIAGNOSIS — E11.9 TYPE 2 DIABETES MELLITUS WITHOUT COMPLICATION, WITHOUT LONG-TERM CURRENT USE OF INSULIN (HCC): Primary | ICD-10-CM

## 2020-07-31 RX ORDER — IBUPROFEN 600 MG/1
600 TABLET ORAL
Qty: 30 TAB | Refills: 0 | Status: SHIPPED | OUTPATIENT
Start: 2020-07-31 | End: 2021-10-12 | Stop reason: ALTCHOICE

## 2020-07-31 NOTE — PROGRESS NOTES
Eze Price [facility] Note      Subjective:     Chief Complaint   Patient presents with    Back Pain     Geovanny Meléndez is a 64y.o. year old male who presents for virtual evaluation of the following:      Low Back Pain  Onset 3-4 day ago, worse last night  Character: throbbing  Location: middle of low back above the tailbone  Duration: intermittent, constant since yesterday  Current Pain Ratin/10  Treatment: icy/hot pack  Radiation into left leg  Denies injury, shortness of breath, fever, constipation, incontinence      Diabetes Mellitus Type II:  Chronic. Diagnosed    Home monitorins 130s from memory  Treatment:   Key Antihyperglycemic Medications             metFORMIN (GLUCOPHAGE) 500 mg tablet Take 1 Tab by mouth two (2) times daily (with meals). - States metformin caused stomach upset  Not adhering to diabetic diet   Complications: None  Co-morbidities: Obesity  Managed by PCP  Lab Results   Component Value Date/Time    Hemoglobin A1c 9.1 (H) 2020 03:05 PM     Lab Results   Component Value Date/Time    Creatinine 0.94 2020 03:05 PM         Review of Systems   Pertinent positives and negative per HPI. All other systems  reviewed are negative for a Comprehensive ROS (10+).        Past Medical History:   Diagnosis Date    Gout         Social History     Socioeconomic History    Marital status:      Spouse name: Not on file    Number of children: Not on file    Years of education: Not on file    Highest education level: Not on file   Occupational History    Not on file   Social Needs    Financial resource strain: Not on file    Food insecurity     Worry: Not on file     Inability: Not on file    Transportation needs     Medical: Not on file     Non-medical: Not on file   Tobacco Use    Smoking status: Former Smoker     Packs/day: 0.50     Years: 12.00     Pack years: 6.00     Last attempt to quit: 10/14/1998     Years since quittin.8  Smokeless tobacco: Never Used   Substance and Sexual Activity    Alcohol use: Yes     Alcohol/week: 31.0 standard drinks     Types: 31 Cans of beer per week     Comment: around 3 per days     Drug use: Never    Sexual activity: Not Currently   Lifestyle    Physical activity     Days per week: Not on file     Minutes per session: Not on file    Stress: Not on file   Relationships    Social connections     Talks on phone: Not on file     Gets together: Not on file     Attends Alevism service: Not on file     Active member of club or organization: Not on file     Attends meetings of clubs or organizations: Not on file     Relationship status: Not on file    Intimate partner violence     Fear of current or ex partner: Not on file     Emotionally abused: Not on file     Physically abused: Not on file     Forced sexual activity: Not on file   Other Topics Concern    Not on file   Social History Narrative    Not on file       Family History   Problem Relation Age of Onset    Diabetes Mother     Hypertension Mother    Almazan Gout Mother     Cancer Father         PROSTATE    Diabetes Father     Anesth Problems Neg Hx        Current Outpatient Medications   Medication Sig    metFORMIN (GLUCOPHAGE) 500 mg tablet Take 1 Tab by mouth two (2) times daily (with meals).  Blood-Glucose Meter monitoring kit Used to check blood sugar once daily. E 11.9 pharmacy to fill based on insurance formulary preference    glucose blood VI test strips (blood glucose test) strip Used to check blood sugar daily. E 11.9 pharmacist to choose based on insurance/glucose monitoring kit type    lancets misc Use to check blood sugar daily. E11.9  Pharmacy fill based on insurance formulary preference and meter type    colchicine 0.6 mg tablet Take 0.6 mg by mouth daily. No current facility-administered medications for this visit. Objective:   No flowsheet data found.    Vitals measurement not available          Physical Examination:  General: Alert, cooperative, no distress, appears stated age. Eyes: Conjunctivae clear. Pupils equally round. Extraocular muscles intact. Ears: Normal appearing external ear   Nose: Nares normal appearing  Mouth/Throat: Lips, mucosa, and tongue normal. Moist mucous membranes. No tonsillar enlargement noted. Neck: Supple, symmetrical, trachea midline, no neck mass visualized. Respiratory: Breathing comfortably, in no acute respiratory distress. Cardiovascular: Visualized extremities without edema. MSK: Upper extremities normal appearing. Skin: No significant erythematous lesions or discoloration noted on facial skin. No rashes or lesions on exposed skin. Neurologic: No facial asymmetry. Normal gaze. Cranial nerves intact. Psychiatric: Affect appropriate. Mood euthymic. Thoughts logical. Speech volume and speed normal. No hallucinations. Well kempt. No visits with results within 3 Month(s) from this visit. Latest known visit with results is:   Office Visit on 03/13/2020   Component Date Value Ref Range Status    Hep C Virus Ab 03/13/2020 <0.1  0.0 - 0.9 s/co ratio Final    Comment:                                   Negative:     < 0.8                               Indeterminate: 0.8 - 0.9                                    Positive:     > 0.9   The CDC recommends that a positive HCV antibody result   be followed up with a HCV Nucleic Acid Amplification   test (111410).       Glucose 03/13/2020 239* 65 - 99 mg/dL Final    BUN 03/13/2020 12  6 - 24 mg/dL Final    Creatinine 03/13/2020 0.94  0.76 - 1.27 mg/dL Final    GFR est non-AA 03/13/2020 90  >59 mL/min/1.73 Final    GFR est AA 03/13/2020 104  >59 mL/min/1.73 Final    BUN/Creatinine ratio 03/13/2020 13  9 - 20 Final    Sodium 03/13/2020 141  134 - 144 mmol/L Final    Potassium 03/13/2020 4.2  3.5 - 5.2 mmol/L Final    Chloride 03/13/2020 100  96 - 106 mmol/L Final    CO2 03/13/2020 24  20 - 29 mmol/L Final    Calcium 03/13/2020 10.1  8.7 - 10.2 mg/dL Final    Protein, total 03/13/2020 7.1  6.0 - 8.5 g/dL Final    Albumin 03/13/2020 4.8  3.8 - 4.9 g/dL Final    GLOBULIN, TOTAL 03/13/2020 2.3  1.5 - 4.5 g/dL Final    A-G Ratio 03/13/2020 2.1  1.2 - 2.2 Final    Bilirubin, total 03/13/2020 0.7  0.0 - 1.2 mg/dL Final    Alk. phosphatase 03/13/2020 70  39 - 117 IU/L Final    AST (SGOT) 03/13/2020 28  0 - 40 IU/L Final    ALT (SGPT) 03/13/2020 69* 0 - 44 IU/L Final    WBC 03/13/2020 6.6  3.4 - 10.8 x10E3/uL Final    RBC 03/13/2020 5.29  4. 14 - 5.80 x10E6/uL Final    HGB 03/13/2020 14.6  13.0 - 17.7 g/dL Final    HCT 03/13/2020 43.4  37.5 - 51.0 % Final    MCV 03/13/2020 82  79 - 97 fL Final    MCH 03/13/2020 27.6  26.6 - 33.0 pg Final    MCHC 03/13/2020 33.6  31.5 - 35.7 g/dL Final    RDW 03/13/2020 12.4  11.6 - 15.4 % Final    PLATELET 90/12/7059 962  150 - 450 x10E3/uL Final    NEUTROPHILS 03/13/2020 60  Not Estab. % Final    Lymphocytes 03/13/2020 30  Not Estab. % Final    MONOCYTES 03/13/2020 9  Not Estab. % Final    EOSINOPHILS 03/13/2020 1  Not Estab. % Final    BASOPHILS 03/13/2020 0  Not Estab. % Final    ABS. NEUTROPHILS 03/13/2020 4.0  1.4 - 7.0 x10E3/uL Final    Abs Lymphocytes 03/13/2020 2.0  0.7 - 3.1 x10E3/uL Final    ABS. MONOCYTES 03/13/2020 0.6  0.1 - 0.9 x10E3/uL Final    ABS. EOSINOPHILS 03/13/2020 0.1  0.0 - 0.4 x10E3/uL Final    ABS. BASOPHILS 03/13/2020 0.0  0.0 - 0.2 x10E3/uL Final    IMMATURE GRANULOCYTES 03/13/2020 0  Not Estab. % Final    ABS. IMM. GRANS. 03/13/2020 0.0  0.0 - 0.1 x10E3/uL Final    Cholesterol, total 03/13/2020 168  100 - 199 mg/dL Final    Triglyceride 03/13/2020 86  0 - 149 mg/dL Final    HDL Cholesterol 03/13/2020 45  >39 mg/dL Final    VLDL, calculated 03/13/2020 17  5 - 40 mg/dL Final    LDL, calculated 03/13/2020 106* 0 - 99 mg/dL Final    INTERPRETATION 03/13/2020 Note   Final    Supplemental report is available.     SPECIMEN STATUS REPORT 03/13/2020 COMMENT   Final    Comment: Verbal Order  See below:  Comment:  Please provide requested information and fax to [phone] or  [phone]. The [place] Code of [employer] requires a written and  signed request be forwarded to a laboratory following a verbal order  of a laboratory test.  Please assist us to meet this requirement and  to complete our records. Date:______________________________  ICD-9/10 Diagnosis Code(s):___________________________________________  Physician or Authorized Designee:_____________________________________                                                Please Print  Physician or Authorized Designee Signature:  ______________________________________________________________________  Your Signature Confirms Your Order Of The Test(s) Listed  Additional Test(s) Requested  Comment:  Test(s) added per Hill Willett at account 03-  Logged by León Pike  Test# [id number] Prostate-Specific Ag, Serum  Test# [id number] Hgb A1c with eAG Estimation  Test# [id number] Glucose                             Diagnosis Codes Provided     Z00.00     Z11.59      Hemoglobin A1c 03/13/2020 9.1* 4.8 - 5.6 % Final    Comment:          Prediabetes: 5.7 - 6.4           Diabetes: >6.4           Glycemic control for adults with diabetes: <7.0      Estimated average glucose 03/13/2020 214  mg/dL Final    Prostate Specific Ag 03/13/2020 0.8  0.0 - 4.0 ng/mL Final    Comment: Roche ECLIA methodology. According to the American Urological Association, Serum PSA should  decrease and remain at undetectable levels after radical  prostatectomy. The AUA defines biochemical recurrence as an initial  PSA value 0.2 ng/mL or greater followed by a subsequent confirmatory  PSA value 0.2 ng/mL or greater. Values obtained with different assay methods or kits cannot be used  interchangeably. Results cannot be interpreted as absolute evidence  of the presence or absence of malignant disease.       Glucose 03/13/2020 244* 65 - 99 mg/dL Final    SPECIMEN STATUS REPORT 03/13/2020 COMMENT   Final    Comment: Written Authorization  Written Authorization  Written Authorization Received. Authorization received from [facility] KATHERIN 03-  Logged by Lorena Soria           Assessment/ Plan:   Diagnoses and all orders for this visit:    1. Type 2 diabetes mellitus without complication, without long-term current use of insulin (HCC)  -     HEMOGLOBIN A1C WITH EAG  -     MICROALBUMIN, UR, RAND W/ MICROALB/CREAT RATIO    2. Acute right-sided low back pain without sciatica  -     ibuprofen (MOTRIN) 600 mg tablet; Take 1 Tab by mouth every six (6) hours as needed for Pain.  -     XR SPINE LUMB 2 OR 3 V; Future      Labs to monitor diabetes. Not compliant with metformin. Exercises and NSIAD as needed for low back pain, likely muscle strain. Xray to evaluate. Follow up for in office examination if not improved in 1 week. We discussed the expected course, resolution and complications of the diagnosis(es) in detail. Medication risks, benefits, costs, interactions, and alternatives were discussed as indicated. I advised him to contact the office if his condition worsens, changes or fails to improve as anticipated. He expressed understanding with the diagnosis(es) and plan. Ml Coulter is a 64 y.o. male being evaluated by a video visit encounter for concerns as above. A caregiver was present when appropriate. Due to this being a TeleHealth encounter (During [id number] public [facility] emergency), evaluation of the following organ systems was limited: Vitals/Constitutional/EENT/Resp/CV/GI//MS/Neuro/Skin/Heme-Lymph-Imm.   Pursuant to the emergency declaration under the [address] waiver authority and the [employer] Act, this Virtual  Visit was conducted, with patient's (and/or legal guardian's) consent, to reduce the patient's risk of exposure to COVID-19 and provide necessary medical care. Services were provided through a video synchronous discussion virtually to substitute for in-person clinic visit. Provider was in the office while conducting this encounter. Patient was at home during encounter. Other persons participating in call: None  Consent:  He and/or his healthcare decision maker is aware that this patient-initiated Telehealth encounter is a billable service, with coverage as determined by his insurance carrier. He is aware that he may receive a bill and has provided verbal consent to proceed: Yes  This virtual visit was conducted via [employer]. Pursuant to the emergency declaration under the [facility] waiver authority and the [employer] and [employer] Act, this Virtual  Visit was conducted to reduce the patient's risk of exposure to COVID-19 and provide continuity of care for an established patient. Services were provided through a video synchronous discussion virtually to substitute for in-person clinic visit. Due to this being a TeleHealth evaluation, many elements of the physical examination are unable to be assessed. Total Time: minutes: 21-30 minutes. Educated patient on red flag symptoms to warrant return to clinic or emergency room visit. I have discussed the diagnosis with the patient and the intended plan as seen in the above orders. The patient has been offered or received an after-visit summary and questions were answered concerning future plans. I have discussed medication side effects and warnings with the patient as well. Follow-up and Dispositions    · Return in about 4 weeks (around 8/28/2020) for Follow Up diabetes.          Signed,    Jraad Abbott MD  7/31/2020

## 2020-08-03 ENCOUNTER — HOSPITAL ENCOUNTER (OUTPATIENT)
Dept: GENERAL RADIOLOGY | Age: 60
Discharge: HOME | End: 2020-08-03
Payer: COMMERCIAL

## 2020-08-03 DIAGNOSIS — M54.50 ACUTE RIGHT-SIDED LOW BACK PAIN WITHOUT SCIATICA: ICD-10-CM

## 2020-08-03 PROCEDURE — 72100 X-RAY EXAM L-S SPINE 2/3 VWS: CPT

## 2020-08-03 NOTE — PATIENT INSTRUCTIONS

## 2020-08-03 NOTE — PROGRESS NOTES
Notify Patient:  Your spine xray shows arthritis and degenerative disc disease.  Continue ibuprofen and back stretches, if this does not improve your pain, we should discuss referral to an orthopedist.

## 2020-08-04 ENCOUNTER — TELEPHONE (OUTPATIENT)
Dept: FAMILY MEDICINE CLINIC | Age: 60
End: 2020-08-04

## 2020-08-04 DIAGNOSIS — M51.36 DDD (DEGENERATIVE DISC DISEASE), LUMBAR: Primary | ICD-10-CM

## 2020-08-04 LAB
ALBUMIN/CREAT UR: 4 MG/G CREAT (ref 0–29)
CREAT UR-MCNC: 195.1 MG/DL
EST. AVERAGE GLUCOSE BLD GHB EST-MCNC: 137 MG/DL
HBA1C MFR BLD: 6.4 % (ref 4.8–5.6)
MICROALBUMIN UR-MCNC: 7.3 UG/ML

## 2020-08-04 NOTE — TELEPHONE ENCOUNTER
----- Message from Rhonda Egan sent at 8/3/2020  2:46 PM EDT -----  Regarding: DR Hao Bravo / Carlos Scherer Message/Vendor Calls    Wife Iliana Quintanilla is requesting that an X-RAY of back be sent to:    [address] [facility]  Phone: [phone] required   Best contact number(s):  [phone]          Rhonda Egan

## 2020-08-05 ENCOUNTER — VIRTUAL VISIT (OUTPATIENT)
Dept: DIABETES SERVICES | Age: 60
End: 2020-08-05
Payer: COMMERCIAL

## 2020-08-05 DIAGNOSIS — E11.9 TYPE 2 DIABETES MELLITUS WITHOUT COMPLICATION, WITHOUT LONG-TERM CURRENT USE OF INSULIN (HCC): ICD-10-CM

## 2020-08-05 PROCEDURE — G0108 DIAB MANAGE TRN  PER INDIV: HCPCS

## 2020-08-05 NOTE — PROGRESS NOTES
[facility] Program for Diabetes Health  Diabetes Self-Management Education   Education and Goal Plan for Session #2B    AADE7 Self-Care Behaviors:  Behavior Education Completed (8/5/2020)   Healthy Eating   Education delivered in previous session. Patient reported following ADA guidelines for healthy meals daily. Being Active   - Effects of exercise on blood glucose     Monitoring     - ADA blood glucose targets  - Frequency of monitoring  - Blood glucose schedule  - Monitoring blood glucose trends per meter  - Alternative site testing  - Glucose monitoring technique  - Disposal of used lancets or needles  - Logging blood glucose results     Patient reported monitoring and recording BG daily. Patient reported awaiting lab test results for A1c. Taking Medications - Medication review     Patient reported not taking medications as prescribed and provider aware of decision. Healthy Coping Not addressed during this session. Reducing Risks Education delivered in previous session. Problem Solving Not addressed during this session. Note: Content derived from the American Association of Diabetes Educators' Diabetes Education Curriculum: A Guide to Successful Self-Management (2nd edition)         Diabetes Educator Recommendations:     - Continue addressing diabetes self-care behaviors through education and support in AADE7 Curriculum individual sessions on reducing risks, healthy eating, being active, monitoring, taking medications, healthy coping and problem solving.     - Continue practicing knowledge and skills relating to monitoring to improve diabetes self-management. - Pt to follow up with provider on the following: Patient reported not taking medications as prescribed and provider aware of decision. Patient to communicate with provider to obtain lab test results and medication adjustments.      Diabetes Self-Management Education Follow-up Visit: 1 week    [facility] Adaptations for Telehealth:    Ryder Matias is a 64 y.o. male being evaluated through a synchronous (real-time) audio-video technology platform, as a substitution for an in-person encounter, to address the healthcare issues mentioned above. I was in the office. The patient was at home. A caregiver was present when appropriate. The patient and/or his healthcare decision maker, is aware that this patient-initiated, Telehealth encounter on 8/5/2020 is a billable service, with coverage as determined by his insurance carrier. He is aware that he may receive a bill and has provided verbal consent to proceed: Yes. This telehealth encounter occurred during the COVID-19 pandemic and public health emergency. Evaluation of the following organ systems was limited: Vitals/Constitutional/EENT/Resp/CV/GI//MS/Neuro/Skin/Heme-Lymph-Imm. Pursuant to the emergency declaration under the [address] [place] [address], [address] [place] [address] authority and the [employer] Act, this Virtual Visit was conducted with patient's (and/or legal guardian's) consent, to reduce the risk of exposure to COVID-19 and provide necessary medical care. --Matthew More RN on 8/5/2020 at 9:32 AM    An electronic signature was used to authenticate this note.  I was in the office for the appointment and time spent: 38 minutes

## 2020-08-05 NOTE — TELEPHONE ENCOUNTER
Discussed with patient and wife. No form needed since results already completed. Discussed treatment options for lumbar DDD- medications, physical therapy, referral to spinal specialist for spinal injections or surgery. Patient elects to trial physical therapy.

## 2020-08-05 NOTE — PROGRESS NOTES
Outbound call to patient verified , informed him of x-ray results and recommendations. Patient verbalized understanding.

## 2020-08-05 NOTE — PROGRESS NOTES
Called patient with no response. Left voicemail. Will send letter of results. Your A1C is improved off metformin. You may continue off this medication.  Your urine test was normal.

## 2020-08-12 ENCOUNTER — VIRTUAL VISIT (OUTPATIENT)
Dept: DIABETES SERVICES | Age: 60
End: 2020-08-12
Payer: COMMERCIAL

## 2020-08-12 DIAGNOSIS — E11.9 TYPE 2 DIABETES MELLITUS WITHOUT COMPLICATION, WITHOUT LONG-TERM CURRENT USE OF INSULIN (HCC): ICD-10-CM

## 2020-08-12 PROCEDURE — G0108 DIAB MANAGE TRN  PER INDIV: HCPCS

## 2020-08-12 NOTE — PROGRESS NOTES
[facility] Program for Diabetes Health  Diabetes Self-Management Education   Education and Goal Plan for Session #3    AADE7 Self-Care Behaviors:  Behavior Education Completed (8/12/2020)   Healthy Eating   Education delivered in previous session. Patient reported following ADA guidelines for healthy meals most days. Being Active   - Effects of exercise on blood glucose  - Physical activity and insulin  - Goals for exercise  - Types of exercise    Patient reported daily physical activity to include walking and yard work. Patient reported scheduled physical therapy to begin next week. Monitoring     - Monitoring blood glucose trends per meter  - Disposal of used lancets or needles  Education delivered in previous session. Taking Medications - Medication review  - Medication schedule  - Insulin: Rapid-acting and long-acting  - Using pens  - Using vial and syringe  - Insulin storage     Patient reported taking all medications as prescribed. Healthy Coping Not addressed during this session. Reducing Risks Education delivered in previous session. Problem Solving - Hypoglycemia signs/symptoms  - Hypoglycemia prevention  - Hypoglycemia treatment: Rule of 15   Note: Content derived from the American Association of Diabetes Educators' Diabetes Education Curriculum: A Guide to Successful Self-Management (2nd edition)         Diabetes Educator Recommendations:     - Continue addressing diabetes self-care behaviors through education and support in AADE7 Curriculum individual sessions on reducing risks, healthy eating, being active, monitoring, taking medications, healthy coping and problem solving.     - Continue practicing knowledge and skills relating to being active and medications to improve diabetes self-management.      - Patient's Identified SMART Goal(s): - Patient to determine     - Pt to follow up with provider on the following: none at this time     Diabetes Self-Management Education Follow-up Visit: 1 week    [facility] Adaptations for Telehealth:    Jeremias Kang is a 64 y.o. male being evaluated through a synchronous (real-time) audio-video technology platform, as a substitution for an in-person encounter, to address the healthcare issues mentioned above. I was in the office. The patient was at home. A caregiver was present when appropriate. The patient and/or his healthcare decision maker, is aware that this patient-initiated, Telehealth encounter on 8/12/2020 is a billable service, with coverage as determined by his insurance carrier. He is aware that he may receive a bill and has provided verbal consent to proceed: Yes. This telehealth encounter occurred during the COVID-19 pandemic and public health emergency. Evaluation of the following organ systems was limited: Vitals/Constitutional/EENT/Resp/CV/GI//MS/Neuro/Skin/Heme-Lymph-Imm. Pursuant to the emergency declaration under the [address] [place] [address], [address] [place] [address] authority and the [employer] and [employer] Act, this Virtual Visit was conducted with patient's (and/or legal guardian's) consent, to reduce the risk of exposure to COVID-19 and provide necessary medical care. --Xavier Lan RN on 8/12/2020 at 10:07 AM    An electronic signature was used to authenticate this note.  I was in the office for the appointment and time spent: 63 minutes

## 2020-08-21 ENCOUNTER — VIRTUAL VISIT (OUTPATIENT)
Dept: DIABETES SERVICES | Age: 60
End: 2020-08-21
Payer: COMMERCIAL

## 2020-08-21 ENCOUNTER — TELEPHONE (OUTPATIENT)
Dept: DIABETES SERVICES | Age: 60
End: 2020-08-21

## 2020-08-21 DIAGNOSIS — E11.9 TYPE 2 DIABETES MELLITUS WITHOUT COMPLICATION, WITHOUT LONG-TERM CURRENT USE OF INSULIN (HCC): ICD-10-CM

## 2020-08-21 PROCEDURE — G0108 DIAB MANAGE TRN  PER INDIV: HCPCS

## 2020-08-21 NOTE — PROGRESS NOTES
[facility]  Diabetes Self-Management Education   Education and Goal Plan for Session #4    AADE7 Self-Care Behaviors:  Behavior Education Completed (8/21/2020)   Healthy Eating   - Balanced plate  - Meal and snack timing  Patient reported folllowing ADA guidelines for healthy meals most days. Being Active   Education delivered in previous session. Monitoring     - Monitoring blood glucose trends per meter  - Disposal of used lancets or needles  - Logging blood glucose results   Taking Medications - Medication review     Patient reported taking all medications as prescribed. Healthy Coping - Coping skills  - Stress management  - Obtaining support   Reducing Risks Education delivered in previous session. Problem Solving - Hypoglycemia signs/symptoms  - Hypoglycemia prevention  - Hypoglycemia treatment: Rule of 15  - Hyperglycemia signs/symptoms  - Hyperglycemia prevention  - Sick day management  - Emergency day management   Note: Content derived from the American Association of Diabetes Educators' Diabetes Education Curriculum: A Guide to Successful Self-Management (2nd edition)         Diabetes Educator Recommendations:     - Continue addressing diabetes self-care behaviors through education and support in AADE7 Curriculum individual sessions on reducing risks, healthy eating, being active, monitoring, taking medications, healthy coping and problem solving.     - Continue practicing knowledge and skills relating to healthy coping to improve diabetes self-management. - Pt to follow up with provider on the following: none at this time     Diabetes Self-Management Education Follow-up Visit: 6 weeks    Neptali Coronel Emergency Adaptations for Telehealth:    Raj Abbott is a 64 y.o. male being evaluated through a synchronous (real-time) audio-video technology platform, as a substitution for an in-person encounter, to address the healthcare issues mentioned above.  I was in the office. The patient was at home. A caregiver was present when appropriate. The patient and/or his healthcare decision maker, is aware that this patient-initiated, Telehealth encounter on 8/21/2020 is a billable service, with coverage as determined by his insurance carrier. He is aware that he may receive a bill and has provided verbal consent to proceed: Yes. This telehealth encounter occurred during the COVID-19 pandemic and public health emergency. Evaluation of the following organ systems was limited: Vitals/Constitutional/EENT/Resp/CV/GI//MS/Neuro/Skin/Heme-Lymph-Imm. Pursuant to the emergency declaration under the [address] [place] [address], [address] [place] [address] authority and the [employer] and [employer] Act, this Virtual Visit was conducted with patient's (and/or legal guardian's) consent, to reduce the risk of exposure to COVID-19 and provide necessary medical care. --iNgel Ring RN on 8/21/2020 at 4:29 PM    An electronic signature was used to authenticate this note.  I was in the office for the appointment and time spent: 65 minutes

## 2020-08-21 NOTE — TELEPHONE ENCOUNTER
Patient called to verify appointment time today. He believed his virtual appointment was at 8:30am this morning. PSR confirmed that appointment is at 3:30pm.  Patient agreed to keep appointment.

## 2020-10-16 ENCOUNTER — VIRTUAL VISIT (OUTPATIENT)
Dept: DIABETES SERVICES | Age: 60
End: 2020-10-16
Payer: COMMERCIAL

## 2020-10-16 DIAGNOSIS — E11.9 CONTROLLED TYPE 2 DIABETES MELLITUS WITHOUT COMPLICATION, WITHOUT LONG-TERM CURRENT USE OF INSULIN (HCC): ICD-10-CM

## 2020-10-16 PROCEDURE — G0108 DIAB MANAGE TRN  PER INDIV: HCPCS

## 2020-10-16 NOTE — PROGRESS NOTES
[facility]  Diabetes Self-Management Education  Post-program Evaluation    Kami Stafford completed diabetes self-management education using the AADE7 Curriculum. Patient's SMART goals and behavior change:  []Healthy Eating: Behavior improved  []Being Active: Behavior improved  []Monitoring: Behavior improved  []Taking Medications: Behavior improved  []Healthy Coping: Behavior improved  []Reducing Risks: Behavior improved  []Problem Solving: Behavior improved    Evaluation of Patient's Progress:  Metric Patient's responses (10/16/2020) Comparison to Pre-program   A1c  (Goal: 7%)   Recent value:   Lab Results   Component Value Date/Time    Hemoglobin A1c 6.4 (H) 08/03/2020 03:51 PM        Improved A1c   Healthy Eating     24-hour Dietary Recall:  Breakfast: yogurt and small apple  Lunch: bologna and cheese on 2 slices bread, snack size bag of chips  Dinner: 2 medium slices meat pizza, tossed salad with light ranch dressing  Snacks: none  Beverages: water  Alcohol:  1 12 oz beer before dinner    Stage of change: Maintenance    Improved in choosing balanced meals Improved consistency of meals Improved consistency of CHO at meals Improved in decreasing sugar-sweetened beverages Improved in choosing heart-healthy fats Improved in choosing lower sodium foods   Being Active     Physical Activity Vital Sign:  How many days during the past week have you performed physical activity where your heart beats faster and your breathing is harder than normal for 30 minutes or more?  0 days    How many days in a typical week do you perform activity such as this?  0 days    Stage of change: Preparation    Patient reported continuing Physical Therapy weekly as prescribed. Monitoring Do you monitor your blood sugar? Yes    How often do you monitor? Patient reported monitoring and recording BG 1-2 times daily. What are the range of readings? 114-144 mg/dL    Do you know your last A1c measurement? Yes    Do you know the meaning of the A1c? Yes    Stage of change: Maintenance    Fewer high readings  Monitoring more frequently   Taking Medications Medication Management:  Do you understand what your diabetes medications do? Yes    Can you afford your diabetes medications? Yes    How often do you miss doses of your diabetes medications? Never    Current dosing:   Key Antihyperglycemic Medications             metFORMIN (GLUCOPHAGE) 500 mg tablet Take 1 Tab by mouth two (2) times daily (with meals). Blood Pressure Management:  Key ACE/ARB Medications     Patient is on no ACE or ARB meds. Lipid Management:  Key Antihyperlipidemia Meds     The patient is on no antihyperlipidemia meds. Clot Prevention:  Key Anti-Platelet Anticoagulant Meds     The patient is on no antiplatelet meds or anticoagulants. Stage of change: Maintenance    Taking all medications as recommended   Healthy Coping Diabetes Skills, Confidence and Preparedness Index: Total score: 6.0  Skills: 5.6  Confidence: 6.1  Preparedness: 6.5    Stage of change: Maintenance    Improved total score  Improved skills score  Improved confidence score  Improved preparedness score   Reducing Risks Vaccines:  Influenza: There is no immunization history for the selected administration types on file for this patient. Pneumococcal: There is no immunization history for the selected administration types on file for this patient. Hepatitis: There is no immunization history for the selected administration types on file for this patient.     Examinations:  Diabetic Foot and Eye Exam HM Status   Topic Date Due    Diabetic Foot Care  08/06/1972    Eye Exam  08/06/1972       Heart Protection:  BP Readings from Last 2 Encounters:   03/13/20 127/84   09/13/19 134/82        Lab Results   Component Value Date/Time    LDL, calculated 106 (H) 03/13/2020 03:05 PM        Kidney Protection:  Lab Results   Component Value Date/Time Microalb/Creat ratio (ug/mg creat.) 4 08/03/2020 03:51 PM       Patient-reported diabetes complications:  none    Stage of change: Maintenance  Reported no new reported diabetes complications   Problem Solving Hypoglycemia Management:  What are signs and symptoms of hypoglycemia that you experience? Dizziness/light-headedness, Weakness    How do you prevent hypoglycemia? Consistent meals/snack times    How do you treat hypoglycemia? Rule of 15    Hyperglycemia Management:  What are signs and symptoms of hyperglycemia that you experience? Extreme thirst, Frequent urination    How can you prevent hyperglycemia? Take medication as instructed, Focus on carbohydrate counting/meal planning    Sick Day Management:  What do you do differently on sick days? Stay hydrated, Check blood glucose every 2-4 hours, Take diabetes medication as instructed by provider    Pattern Management:  Do you notice blood glucose patterns when you look at the readings in your meter or logbook? Yes    How do you use the blood glucose readings from your meter or logbook? Understand how body responds to meals, Adjust meals based on results    Stage of change: Maintenance  Improved understanding of hyperglycemia management  Improved understanding of hypoglycemia management  Improved understanding of sick day management  Improved understanding of pattern management   Note: Content derived from the American Association of Diabetes Educators' Diabetes Education Curriculum: A Guide to Successful Self-Management (2nd edition)       Diabetes Educator Recommendations:     - Patient may benefit from follow-up diabetes education sessions to develop goals and plans on problem solving. Patient is currently in the Maintenance stage of change. Discussed with patient follow-up appointment: Patient to call for questions or to schedule future appointment as needed. Neptali Coronel Emergency Adaptations for Telehealth:    Morenita Fishman is a 64 y.o. male being evaluated through a synchronous (real-time) audio-video technology platform, as a substitution for an in-person encounter, to address the healthcare issues mentioned above. A caregiver was present when appropriate. I was in the office. The patient was at home. The patient and/or his healthcare decision maker, is aware that this patient-initiated, Telehealth encounter on 10/16/2020 is a billable service, with coverage as determined by his insurance carrier. He is aware that he may receive a bill and has provided verbal consent to proceed: Yes. This telehealth encounter occurred during the COVID-19 pandemic and public health emergency. Evaluation of the following organ systems was limited: Vitals/Constitutional/EENT/Resp/CV/GI//MS/Neuro/Skin/Heme-Lymph-Imm. Pursuant to the emergency declaration under the [address] [place] [address], [address] [place] [address] authority and the [employer] and [employer] Act, this Virtual Visit was conducted with patient's (and/or legal guardian's) consent, to reduce the risk of exposure to COVID-19 and provide necessary medical care. --Colt Victoria RN on 10/16/2020 at 4:25 PM    An electronic signature was used to authenticate this note. I was in the office for the appointment and time spent: 32 minutes  Overall SCPI score: 6.0 Skills Score: 5.6  Low: Healthy Coping(Q7) Confidence Score: 6.1  Low: Healthy Coping(Q2) Preparedness Score: 6.5  Low: Healthy Coping(Q3)   Healthy Eating Score: 7.0  Low: Skills(Q1),Confidence(Q1),Confidence(Q4),Preparedness(Q1) Taking Medication Score: 6.0  Low: Skills(Q2) Blood Sugar Monitoring Score: 5.6  Low: Skills(Q4),Skills(Q8),Confidence(Q6) Reducing Risks Score: 6.3  Low: RLCYKV(O2)   Problem Solving Score: 6.7  Low: Skills(Q6) Healthy Coping Score: 4.0  Low: Skills(Q7),Confidence(Q2),Preparedness(Q3) Being Active Score: 6.5  Low: Confidence(Q5)     Skills/Knowledge Questions   1.  I know how to plan meals that have the best balance between carbohydrates, proteins and vegetables. 7   2. I know how my diabetes medications (pills, injectables and/or insulin) work in my body. 6   3. I know when to check my blood sugar if I want to see how my body responded to a meal. 6   4. I know when to check my blood sugars to determine if my medication or insulin doses are correct. 5   5. I know what to do to prevent a low blood sugar when I exercise (either before, during, or after). 6   6. When I am sick, I know what to do differently with my diabetes management. 6   7. I know how stress can affect my diabetes management. 4   8. When I look at my blood sugars over a given week, I can explain what my blood sugar pattern is. 5   9. I know what my target levels are for A1c, blood pressure and cholesterol. 5   Confidence Questions   1. I am confident that I can plan balanced meals and snacks. 7   2. I am confident that I can manage my stress. 4   3. I am confident that I can prevent a low blood sugar during or after exercise. 7   4. I am confident that the next time I eat out, I will be able to choose foods that best keep my blood sugars in target. 7   5. I am confident I can include exercise into my schedule. 6   6. I am confident that I can use my daily blood sugars to adjust my diet, my activity, and/or my insulin. 5   7. When something out of my normal routine happens, I am confident that I can problem-solve and keep my diabetes on track. 7   Preparedness Questions   1. Within the next month, I will begin to eat more balanced meals and snacks. 8   2. Within the next month, I will choose an exercise activity and I will start fitting it into my schedule. 8   3. Within the next month, I will make a list of stress management options that work for me. 4   4. Within the next month, I will consistently plan ahead to prevent low blood sugars. 7   5.  Within the next month, I will start adjusting my insulin doses on my own. 0   6. Within the next month, I will begin making changes to my diabetes management based on my daily blood sugars (eg - eating, activity and/or insulin). 8   7. Within the next month, I will begin making changes to my diabetes management to meet my overall goals (eg - eating, activity and/or insulin).

## 2021-06-16 ENCOUNTER — VIRTUAL VISIT (OUTPATIENT)
Dept: FAMILY MEDICINE CLINIC | Age: 61
End: 2021-06-16
Payer: COMMERCIAL

## 2021-06-16 DIAGNOSIS — B35.4 TINEA CORPORIS: Primary | ICD-10-CM

## 2021-06-16 PROCEDURE — 99213 OFFICE O/P EST LOW 20 MIN: CPT | Performed by: FAMILY MEDICINE

## 2021-06-16 RX ORDER — CLOTRIMAZOLE AND BETAMETHASONE DIPROPIONATE 10; .64 MG/G; MG/G
CREAM TOPICAL
Qty: 45 G | Refills: 1 | Status: SHIPPED | OUTPATIENT
Start: 2021-06-16 | End: 2021-10-12 | Stop reason: ALTCHOICE

## 2021-06-16 NOTE — PROGRESS NOTES
[id number] [address] Note      Assessment/ Plan:   Diagnoses and all orders for this visit:    1. Tinea corporis  -     clotrimazole-betamethasone (LOTRISONE) topical cream; Apply yto      Recommendations based on history, physical exam and review of pertinent labs, studies and medications:   Rash consistent with tinea corporis, multiple locations. Topical antifungal to treat. We discussed the expected course, resolution and complications of the diagnosis(es) in detail. Medication risks, benefits, costs, interactions, and alternatives were discussed as indicated. I advised him to contact the office if his condition worsens, changes or fails to improve as anticipated. He expressed understanding with the diagnosis(es) and plan. Wilfredo Terry is a 64 y.o. male being evaluated by a video visit encounter for concerns as above. A caregiver was present when appropriate. Due to this being a TeleHealth encounter (During ZQUW-97 public health emergency), evaluation of the following organ systems was limited: Vitals/Constitutional/EENT/Resp/CV/GI//MS/Neuro/Skin/Heme-Lymph-Imm. Pursuant to the emergency declaration under the [facility], [address] waiver authority and the [employer] and [employer] Act, this Virtual  Visit was conducted, with patient's (and/or legal guardian's) consent, to reduce the patient's risk of exposure to COVID-19 and provide necessary medical care. Services were provided through a video synchronous discussion virtually to substitute for in-person clinic visit. Provider was in the office while conducting this encounter. Patient was at home during encounter.    Other persons participating in call: None  Consent:  He and/or his healthcare decision maker is aware that this patient-initiated Telehealth encounter is a billable service, with coverage as determined by his insurance carrier. He is aware that he may receive a bill and has provided verbal consent to proceed: Yes  This virtual visit was conducted via [employer]. Pursuant to the emergency declaration under the [facility] [place] [address] [facility] authority and the [employer] and [employer] Act, this Virtual  Visit was conducted to reduce the patient's risk of exposure to COVID-19 and provide continuity of care for an established patient. Services were provided through a video synchronous discussion virtually to substitute for in-person clinic visit. Due to this being a TeleHealth evaluation, many elements of the physical examination are unable to be assessed. Total Time: minutes: 5-10 minutes. Follow-up and Dispositions    · Return if symptoms worsen or fail to improve. Subjective:   No chief complaint on file. Vish Riley is a 64y.o. year old male who presents for evaluation of the following:    Rash: Onset  2 week ago   Bialterl legs, spreading  Iván ithcing, pain      Review of Systems   Pertinent positives and negative per HPI. All other systems  reviewed are negative for a Comprehensive ROS (10+). Past medical history, social history, family history reviewed. Medications reconciled. Objective:   No flowsheet data found. Vitals measurement not available     Physical Examination:  General: Alert, cooperative, no distress, appears stated age. Eyes: Conjunctivae clear. Pupils equally round. Extraocular muscles intact. Ears: Normal appearing external ear   Nose: Nares normal appearing  Mouth/Throat: Lips, mucosa, and tongue normal. Moist mucous membranes. No tonsillar enlargement noted. Neck: Supple, symmetrical, trachea midline, no neck mass visualized. Respiratory: Breathing comfortably, in no acute respiratory distress. Cardiovascular: Visualized extremities without edema. MSK: Upper extremities normal appearing.   Skin: Multiple erythematous circular skin lesions with raised borders, well-circumscribed  Neurologic: No facial asymmetry. Normal gaze. Cranial nerves intact. Psychiatric: Affect appropriate. Mood euthymic. Thoughts logical. Speech volume and speed normal. No hallucinations. Well kempt.        Signed,    Dacia White MD  6/16/2021

## 2021-06-17 DIAGNOSIS — E11.9 TYPE 2 DIABETES MELLITUS WITHOUT COMPLICATION, WITHOUT LONG-TERM CURRENT USE OF INSULIN (HCC): ICD-10-CM

## 2021-06-21 RX ORDER — BLOOD SUGAR DIAGNOSTIC
STRIP MISCELLANEOUS
Qty: 50 STRIP | Refills: 5 | Status: SHIPPED | OUTPATIENT
Start: 2021-06-21 | End: 2021-10-12 | Stop reason: ALTCHOICE

## 2021-06-21 RX ORDER — LANCETS
EACH MISCELLANEOUS
Qty: 102 EACH | Refills: 0 | Status: SHIPPED | OUTPATIENT
Start: 2021-06-21 | End: 2021-09-03 | Stop reason: SDUPTHER

## 2021-06-24 NOTE — PATIENT INSTRUCTIONS
Ringworm: Care Instructions  Your Care Instructions  Ringworm is a fungus infection of the skin. It is not caused by a worm. Ringworm causes a round, scaly rash that may crack and itch. The rash can spread over a wide area. One type of fungus that causes ringworm is often found in locker rooms and swimming pools. It grows well in warm, moist areas of the skin, such as in skin folds. You can get ringworm by sharing towels, clothing, and sports equipment. You can also get it by touching someone who has ringworm. Ringworm is treated with cream that kills the fungus. If the rash is widespread, you may need pills to get rid of it. Ringworm often comes back after treatment. If the rash becomes infected with bacteria, you may need antibiotics. Follow-up care is a key part of your treatment and safety. Be sure to make and go to all appointments, and call your doctor if you are having problems. It's also a good idea to know your test results and keep a list of the medicines you take. How can you care for yourself at home? · Take your medicines exactly as prescribed. Call your doctor if you have any problems with your medicine. · Wash the rash with soap and water, remove flaky skin, and dry thoroughly. · Try an over-the-counter antifungal cream. Spread the cream beyond the edge or border of the rash. Follow the directions on the package. Do not stop using the medicine just because your skin clears up. You will probably need to continue treatment for 2 to 4 weeks or longer. · To avoid spreading it, wash your hands well after treating or touching the rash. · To keep from getting another infection:  ? Do not go barefoot in public places such as gyms or locker rooms. Avoid sharing towels and clothes. Use flip-flops or some other type of shoe in the shower. ? Do not wear tight clothes or let your skin stay damp for long periods, such as by staying in a wet bathing suit or sweaty clothes.   When should you call for help?   Call your doctor now or seek immediate medical care if:    · You have signs of infection such as:  ? Pain, warmth, or swelling in your skin. ? Red streaks near a wound in the skin. ? Pus coming from the rash on your skin. ? A fever. Watch closely for changes in your health, and be sure to contact your doctor if:    · Your ringworm does not improve after 2 weeks of treatment.     · You do not get better as expected. Where can you learn more? Go to http://www.gray.com/  Enter Z990 in the search box to learn more about \"Ringworm: Care Instructions. \"  Current as of: July 2, 2020               Content Version: 12.8  © 2006-2021 Healthwise, [employer]. Care instructions adapted under license by [employer] (which disclaims liability or warranty for this information). If you have questions about a medical condition or this instruction, always ask your healthcare professional. [address] any warranty or liability for your use of this information.

## 2021-09-03 DIAGNOSIS — E11.9 TYPE 2 DIABETES MELLITUS WITHOUT COMPLICATION, WITHOUT LONG-TERM CURRENT USE OF INSULIN (HCC): ICD-10-CM

## 2021-09-03 NOTE — TELEPHONE ENCOUNTER
Please route to covering provider    Pt has a scheduled appointment with Dr. Marina chen. Isac Hutchinson on 10/12/2021. Can pt have a refill until seen by new provider?      Last visit 06/16/2021 Virtual visit MD Michael Reagan   Next appointment 10/12/2021 MD Isac Hutchinson   Previous refill encounter(s)   06/21/2021 Accu-Chek Fastclix Lancets #102     Requested Prescriptions     Pending Prescriptions Disp Refills    lancets (Accu-Chek Fastclix Lancet Drum) misc 102 Each 0     Sig: USE 1  TO CHECK GLUCOSE ONCE DAILY

## 2021-09-09 RX ORDER — LANCETS
EACH MISCELLANEOUS
Qty: 102 EACH | Refills: 0 | Status: SHIPPED | OUTPATIENT
Start: 2021-09-09 | End: 2021-10-12 | Stop reason: ALTCHOICE

## 2021-10-12 ENCOUNTER — OFFICE VISIT (OUTPATIENT)
Dept: FAMILY MEDICINE CLINIC | Age: 61
End: 2021-10-12
Payer: COMMERCIAL

## 2021-10-12 VITALS
HEART RATE: 69 BPM | OXYGEN SATURATION: 98 % | HEIGHT: 73 IN | TEMPERATURE: 97.3 F | WEIGHT: 200 LBS | RESPIRATION RATE: 20 BRPM | DIASTOLIC BLOOD PRESSURE: 74 MMHG | SYSTOLIC BLOOD PRESSURE: 108 MMHG | BODY MASS INDEX: 26.51 KG/M2

## 2021-10-12 DIAGNOSIS — R35.0 FREQUENCY OF URINATION: ICD-10-CM

## 2021-10-12 DIAGNOSIS — Z13.29 SCREENING FOR THYROID DISORDER: ICD-10-CM

## 2021-10-12 DIAGNOSIS — Z00.00 ENCOUNTER FOR MEDICAL EXAMINATION TO ESTABLISH CARE: Primary | ICD-10-CM

## 2021-10-12 DIAGNOSIS — E78.5 DYSLIPIDEMIA: ICD-10-CM

## 2021-10-12 DIAGNOSIS — R73.03 BORDERLINE DIABETES MELLITUS: ICD-10-CM

## 2021-10-12 DIAGNOSIS — Z12.5 SCREENING FOR PROSTATE CANCER: ICD-10-CM

## 2021-10-12 DIAGNOSIS — E55.9 VITAMIN D DEFICIENCY: ICD-10-CM

## 2021-10-12 DIAGNOSIS — Z11.59 NEED FOR HEPATITIS C SCREENING TEST: ICD-10-CM

## 2021-10-12 DIAGNOSIS — E11.9 TYPE 2 DIABETES MELLITUS WITHOUT COMPLICATION, WITHOUT LONG-TERM CURRENT USE OF INSULIN (HCC): ICD-10-CM

## 2021-10-12 PROCEDURE — 99386 PREV VISIT NEW AGE 40-64: CPT | Performed by: INTERNAL MEDICINE

## 2021-10-12 NOTE — PATIENT INSTRUCTIONS
Well Visit, Men 48 to 72: Care Instructions  Overview     Well visits can help you stay healthy. Your doctor has checked your overall health and may have suggested ways to take good care of yourself. Your doctor also may have recommended tests. At home, you can help prevent illness with healthy eating, regular exercise, and other steps. Follow-up care is a key part of your treatment and safety. Be sure to make and go to all appointments, and call your doctor if you are having problems. It's also a good idea to know your test results and keep a list of the medicines you take. How can you care for yourself at home? · Get screening tests that you and your doctor decide on. Screening helps find diseases before any symptoms appear. · Eat healthy foods. Choose fruits, vegetables, whole grains, protein, and low-fat dairy foods. Limit fat, especially saturated fat. Reduce salt in your diet. · Limit alcohol. Have no more than 2 drinks a day or 14 drinks a week. · Get at least 30 minutes of exercise on most days of the week. Walking is a good choice. You also may want to do other activities, such as running, swimming, cycling, or playing tennis or team sports. · Reach and stay at a healthy weight. This will lower your risk for many problems, such as obesity, diabetes, heart disease, and high blood pressure. · Do not smoke. Smoking can make health problems worse. If you need help quitting, talk to your doctor about stop-smoking programs and medicines. These can increase your chances of quitting for good. · Care for your mental health. It is easy to get weighed down by worry and stress. Learn strategies to manage stress, like deep breathing and mindfulness, and stay connected with your family and community. If you find you often feel sad or hopeless, talk with your doctor. Treatment can help. · Talk to your doctor about whether you have any risk factors for sexually transmitted infections (STIs).  You can help prevent STIs if you wait to have sex with a new partner (or partners) until you've each been tested for STIs. It also helps if you use condoms (male or female condoms) and if you limit your sex partners to one person who only has sex with you. Vaccines are available for some STIs. · If it's important to you to prevent pregnancy with your partner, talk with your doctor about birth control options that might be best for you. · If you think you may have a problem with alcohol or drug use, talk to your doctor. This includes prescription medicines (such as amphetamines and opioids) and illegal drugs (such as cocaine and methamphetamine). Your doctor can help you figure out what type of treatment is best for you. · Protect your skin from too much sun. When you're outdoors from 10 a.m. to 4 p.m., stay in the shade or cover up with clothing and a hat with a wide brim. Wear sunglasses that block UV rays. Even when it's cloudy, put broad-spectrum sunscreen (SPF 30 or higher) on any exposed skin. · See a dentist one or two times a year for checkups and to have your teeth cleaned. · Wear a seat belt in the car. When should you call for help? Watch closely for changes in your health, and be sure to contact your doctor if you have any problems or symptoms that concern you. Where can you learn more? Go to http://www.gray.com/  Enter O302 in the search box to learn more about \"Well Visit, Men 48 to 72: Care Instructions. \"  Current as of: February 11, 2021               Content Version: 13.0  © 4171-4761 Healthwise, Incorporated. Care instructions adapted under license by [employer] (which disclaims liability or warranty for this information). If you have questions about a medical condition or this instruction, always ask your healthcare professional. [address] any warranty or liability for your use of this information.

## 2021-10-12 NOTE — PROGRESS NOTES
Chief Complaint   Patient presents with    New Patient     HPI:  Jose Citizen is a 63 y.o. AA male with significant medical history of diabetes and gout presents to establish care. Patient states he is stopped taking metformin. Last A1C was 6.4%2020. Patient has no complaints. Review of Systems  Constitutional: negative for fevers/chills  Eyes:   negative for visual disturbance  Respiratory:  negative for cough, dyspnea, wheezing  CV:   negative for chest pain, palpitations, lower extremity edema  GI:   negative for abdominal pain  Endo:             negative for polyuria,polydipsia,polyphagia,heat intolerance  Genitourinary: positive for frequency, dysuria   Integument:  negative for rash and pruritus  Hematologic:  negative for easy bruising and gum/nose bleeding  Musculoskel: negative for myalgias, arthralgias, back pain  Neurological:  negative for headaches, dizziness  Behavl/Psych: negative for feelings of anxiety, depression, mood changes    Past Medical History:   Diagnosis Date    Gout      Past Surgical History:   Procedure Laterality Date    HX COLONOSCOPY      HX OTHER SURGICAL  1990s    ORAL SURGERY     Social History     Socioeconomic History    Marital status:      Spouse name: Not on file    Number of children: Not on file    Years of education: Not on file    Highest education level: Not on file   Tobacco Use    Smoking status: Former Smoker     Packs/day: 0.50     Years: 12.00     Pack years: 6.00     Quit date: 10/14/1998     Years since quittin.0    Smokeless tobacco: Never Used   Substance and Sexual Activity    Alcohol use:  Yes     Alcohol/week: 31.0 standard drinks     Types: 31 Cans of beer per week     Comment: around 3 per days     Drug use: Never    Sexual activity: Not Currently     Social Determinants of Health     Financial Resource Strain:     Difficulty of Paying Living Expenses:    Food Insecurity:     Worried About Running Out of Food in the Last Year:     Ran Out of Food in the Last Year:    Transportation Needs:     Lack of Transportation (Medical):      Lack of Transportation (Non-Medical):    Physical Activity:     Days of Exercise per Week:     Minutes of Exercise per Session:    Stress:     Feeling of Stress :    Social Connections:     Frequency of Communication with Friends and Family:     Frequency of Social Gatherings with Friends and Family:     Attends Oriental orthodox Services:     Active Member of Clubs or Organizations:     Attends Club or Organization Meetings:     Marital Status:      Family History   Problem Relation Age of Onset    Diabetes Mother     Hypertension Mother     Gout Mother     Cancer Father         PROSTATE    Diabetes Father     Anesth Problems Neg Hx        No Known Allergies    Objective:  Visit Vitals  /74   Pulse 69   Temp 97.3 °F (36.3 °C) (Temporal)   Resp 20   Ht 6' 1\" (1.854 m)   Wt 200 lb (90.7 kg)   SpO2 98%   BMI 26.39 kg/m²     Physical Exam:   General appearance - alert, well appearing in no distress  Mental status - alert, oriented to person, place, and time  EYE-PERRL, EOMI  ENT-ENT exam normal, no neck nodes or sinus tenderness  Neck - supple, no significant adenopathy   Chest - clear to auscultation, no wheezes, rales or rhonchi  Heart - normal rate, regular rhythm, no murmurs  Abdomen - soft, nontender, nondistended, no organomegaly  Ext-peripheral pulses normal, no pedal edema  Neuro -no focal findings   Back-full range of motion, no tenderness, palpable spasm or pain on motion     Results for orders placed or performed in visit on 07/31/20   HEMOGLOBIN A1C WITH EAG   Result Value Ref Range    Hemoglobin A1c 6.4 (H) 4.8 - 5.6 %    Estimated average glucose 137 mg/dL   MICROALBUMIN, UR, RAND W/ MICROALB/CREAT RATIO   Result Value Ref Range    Creatinine, urine 195.1 Not Estab. mg/dL    Microalbumin, urine 7.3 Not Estab. ug/mL    Microalb/Creat ratio (ug/mg creat.) 4 0 - 29 mg/g creat Assessment/Plan:  Diagnoses and all orders for this visit:    Encounter for medical examination to establish care  -     METABOLIC PANEL, COMPREHENSIVE; Future  -     CBC W/O DIFF; Future  -     METABOLIC PANEL, COMPREHENSIVE  -     CBC W/O DIFF    Need for hepatitis C screening test  -     HEPATITIS C AB; Future  -     HEPATITIS C AB    Borderline diabetes mellitus  -     HEMOGLOBIN A1C WITH EAG; Future  -     MICROALBUMIN, UR, RAND W/ MICROALB/CREAT RATIO; Future  -     HEMOGLOBIN A1C WITH EAG  -     MICROALBUMIN, UR, RAND W/ MICROALB/CREAT RATIO    Dyslipidemia  -     LIPID PANEL; Future  -     LIPID PANEL    Vitamin D deficiency  -     VITAMIN D, 25 HYDROXY; Future  -     VITAMIN D, 25 HYDROXY    Screening for prostate cancer  -     PSA, DIAGNOSTIC (PROSTATE SPECIFIC AG); Future  -     PSA, DIAGNOSTIC (PROSTATE SPECIFIC AG)    Frequency of urination  -     URINALYSIS W/ RFLX MICROSCOPIC; Future  -     URINALYSIS W/ RFLX MICROSCOPIC    Screening for thyroid disorder  -     TSH 3RD GENERATION; Future  -     TSH 3RD GENERATION    Type 2 diabetes mellitus without complication, without long-term current use of insulin (HCC)  -     METABOLIC PANEL, COMPREHENSIVE; Future  -     CBC W/O DIFF; Future  -     HEMOGLOBIN A1C WITH EAG; Future  -     MICROALBUMIN, UR, RAND W/ MICROALB/CREAT RATIO; Future  -     METABOLIC PANEL, COMPREHENSIVE  -     CBC W/O DIFF  -     HEMOGLOBIN A1C WITH EAG  -     MICROALBUMIN, UR, RAND W/ MICROALB/CREAT RATIO      Patient Instructions        Well Visit, Men 48 to 72: Care Instructions  Overview     Well visits can help you stay healthy. Your doctor has checked your overall health and may have suggested ways to take good care of yourself. Your doctor also may have recommended tests. At home, you can help prevent illness with healthy eating, regular exercise, and other steps. Follow-up care is a key part of your treatment and safety.  Be sure to make and go to all appointments, and call your doctor if you are having problems. It's also a good idea to know your test results and keep a list of the medicines you take. How can you care for yourself at home? · Get screening tests that you and your doctor decide on. Screening helps find diseases before any symptoms appear. · Eat healthy foods. Choose fruits, vegetables, whole grains, protein, and low-fat dairy foods. Limit fat, especially saturated fat. Reduce salt in your diet. · Limit alcohol. Have no more than 2 drinks a day or 14 drinks a week. · Get at least 30 minutes of exercise on most days of the week. Walking is a good choice. You also may want to do other activities, such as running, swimming, cycling, or playing tennis or team sports. · Reach and stay at a healthy weight. This will lower your risk for many problems, such as obesity, diabetes, heart disease, and high blood pressure. · Do not smoke. Smoking can make health problems worse. If you need help quitting, talk to your doctor about stop-smoking programs and medicines. These can increase your chances of quitting for good. · Care for your mental health. It is easy to get weighed down by worry and stress. Learn strategies to manage stress, like deep breathing and mindfulness, and stay connected with your family and community. If you find you often feel sad or hopeless, talk with your doctor. Treatment can help. · Talk to your doctor about whether you have any risk factors for sexually transmitted infections (STIs). You can help prevent STIs if you wait to have sex with a new partner (or partners) until you've each been tested for STIs. It also helps if you use condoms (male or female condoms) and if you limit your sex partners to one person who only has sex with you. Vaccines are available for some STIs. · If it's important to you to prevent pregnancy with your partner, talk with your doctor about birth control options that might be best for you.   · If you think you may have a problem with alcohol or drug use, talk to your doctor. This includes prescription medicines (such as amphetamines and opioids) and illegal drugs (such as cocaine and methamphetamine). Your doctor can help you figure out what type of treatment is best for you. · Protect your skin from too much sun. When you're outdoors from 10 a.m. to 4 p.m., stay in the shade or cover up with clothing and a hat with a wide brim. Wear sunglasses that block UV rays. Even when it's cloudy, put broad-spectrum sunscreen (SPF 30 or higher) on any exposed skin. · See a dentist one or two times a year for checkups and to have your teeth cleaned. · Wear a seat belt in the car. When should you call for help? Watch closely for changes in your health, and be sure to contact your doctor if you have any problems or symptoms that concern you. Where can you learn more? Go to http://www.gray.com/  Enter G921 in the search box to learn more about \"Well Visit, Men 48 to 72: Care Instructions. \"  Current as of: February 11, 2021               Content Version: 13.0  © 3969-4859 Healthwise, Incorporated. Care instructions adapted under license by [employer] (which disclaims liability or warranty for this information). If you have questions about a medical condition or this instruction, always ask your healthcare professional. [place] [address] any warranty or liability for your use of this information. Follow-up and Dispositions    · Return 2-3 weeks, for routine follow up.

## 2021-10-13 LAB
25(OH)D3+25(OH)D2 SERPL-MCNC: 20.2 NG/ML (ref 30–100)
ALBUMIN SERPL-MCNC: 5.2 G/DL (ref 3.8–4.9)
ALBUMIN/CREAT UR: <3 MG/G CREAT (ref 0–29)
ALBUMIN/GLOB SERPL: 2.5 {RATIO} (ref 1.2–2.2)
ALP SERPL-CCNC: 65 IU/L (ref 44–121)
ALT SERPL-CCNC: 31 IU/L (ref 0–44)
APPEARANCE UR: CLEAR
AST SERPL-CCNC: 19 IU/L (ref 0–40)
BILIRUB SERPL-MCNC: 0.5 MG/DL (ref 0–1.2)
BILIRUB UR QL STRIP: NEGATIVE
BUN SERPL-MCNC: 17 MG/DL (ref 6–24)
BUN/CREAT SERPL: 20 (ref 9–20)
CALCIUM SERPL-MCNC: 9.7 MG/DL (ref 8.7–10.2)
CHLORIDE SERPL-SCNC: 101 MMOL/L (ref 96–106)
CHOLEST SERPL-MCNC: 192 MG/DL (ref 100–199)
CO2 SERPL-SCNC: 28 MMOL/L (ref 20–29)
COLOR UR: YELLOW
CREAT SERPL-MCNC: 0.83 MG/DL (ref 0.76–1.27)
CREAT UR-MCNC: 102.4 MG/DL
ERYTHROCYTE [DISTWIDTH] IN BLOOD BY AUTOMATED COUNT: 12.4 % (ref 11.6–15.4)
EST. AVERAGE GLUCOSE BLD GHB EST-MCNC: 131 MG/DL
GLOBULIN SER CALC-MCNC: 2.1 G/DL (ref 1.5–4.5)
GLUCOSE SERPL-MCNC: 98 MG/DL (ref 65–99)
GLUCOSE UR QL: NEGATIVE
HBA1C MFR BLD: 6.2 % (ref 4.8–5.6)
HCT VFR BLD AUTO: 46.3 % (ref 37.5–51)
HCV AB S/CO SERPL IA: <0.1 S/CO RATIO (ref 0–0.9)
HDLC SERPL-MCNC: 62 MG/DL
HGB BLD-MCNC: 14.9 G/DL (ref 13–17.7)
HGB UR QL STRIP: NEGATIVE
KETONES UR QL STRIP: NEGATIVE
LDLC SERPL CALC-MCNC: 117 MG/DL (ref 0–99)
LEUKOCYTE ESTERASE UR QL STRIP: NEGATIVE
MCH RBC QN AUTO: 27.6 PG (ref 26.6–33)
MCHC RBC AUTO-ENTMCNC: 32.2 G/DL (ref 31.5–35.7)
MCV RBC AUTO: 86 FL (ref 79–97)
MICRO URNS: NORMAL
MICROALBUMIN UR-MCNC: <3 UG/ML
NITRITE UR QL STRIP: NEGATIVE
PH UR STRIP: 5.5 [PH] (ref 5–7.5)
PLATELET # BLD AUTO: 253 X10E3/UL (ref 150–450)
POTASSIUM SERPL-SCNC: 5 MMOL/L (ref 3.5–5.2)
PROT SERPL-MCNC: 7.3 G/DL (ref 6–8.5)
PROT UR QL STRIP: NEGATIVE
PSA SERPL-MCNC: 1 NG/ML (ref 0–4)
RBC # BLD AUTO: 5.4 X10E6/UL (ref 4.14–5.8)
SODIUM SERPL-SCNC: 140 MMOL/L (ref 134–144)
SP GR UR: 1.02 (ref 1–1.03)
TRIGL SERPL-MCNC: 70 MG/DL (ref 0–149)
TSH SERPL DL<=0.005 MIU/L-ACNC: 0.93 UIU/ML (ref 0.45–4.5)
UROBILINOGEN UR STRIP-MCNC: 0.2 MG/DL (ref 0.2–1)
VLDLC SERPL CALC-MCNC: 13 MG/DL (ref 5–40)
WBC # BLD AUTO: 6.7 X10E3/UL (ref 3.4–10.8)

## 2021-10-26 DIAGNOSIS — E78.5 DYSLIPIDEMIA: ICD-10-CM

## 2021-10-26 DIAGNOSIS — E55.9 VITAMIN D DEFICIENCY: Primary | ICD-10-CM

## 2021-10-26 DIAGNOSIS — E11.9 DIABETES MELLITUS TYPE 2, DIET-CONTROLLED (HCC): ICD-10-CM

## 2021-10-26 RX ORDER — ACETAMINOPHEN 500 MG
2000 TABLET ORAL DAILY
Qty: 90 CAPSULE | Refills: 1 | Status: SHIPPED | OUTPATIENT
Start: 2021-10-26 | End: 2021-10-28 | Stop reason: SDUPTHER

## 2021-10-28 ENCOUNTER — VIRTUAL VISIT (OUTPATIENT)
Dept: FAMILY MEDICINE CLINIC | Age: 61
End: 2021-10-28
Payer: COMMERCIAL

## 2021-10-28 DIAGNOSIS — E78.5 DYSLIPIDEMIA (HIGH LDL; LOW HDL): ICD-10-CM

## 2021-10-28 DIAGNOSIS — E11.9 DIABETES MELLITUS TYPE 2, DIET-CONTROLLED (HCC): Primary | ICD-10-CM

## 2021-10-28 DIAGNOSIS — E55.9 VITAMIN D DEFICIENCY: ICD-10-CM

## 2021-10-28 PROCEDURE — 99214 OFFICE O/P EST MOD 30 MIN: CPT | Performed by: INTERNAL MEDICINE

## 2021-10-28 RX ORDER — ACETAMINOPHEN 500 MG
2000 TABLET ORAL DAILY
Qty: 90 CAPSULE | Refills: 1 | Status: SHIPPED | OUTPATIENT
Start: 2021-10-28

## 2021-10-28 NOTE — PROGRESS NOTES
Chief Complaint   Patient presents with    Results     HPI:  Vivienne Torres is a 63 y.o. male who was seen by synchronous (real-time) audio-video technology on 10/28/2021 for Results    Assessment & Plan:   Diagnoses and all orders for this visit:    1. Diabetes mellitus type 2, diet-controlled ([facility] [address]    2. Vitamin D deficiency  -     cholecalciferol (VITAMIN D3) (2,000 UNITS /50 MCG) cap capsule; Take 1 Capsule by mouth daily. 3. Dyslipidemia (high LDL; low HDL)      I spent at least 20 minutes on this visit with this established patient. 712  Subjective:   Vivienne Torres is a 63 y.o. male  LDL is slightly up, A1C shows well controlled diabetes  Vit D level is below normal, supplement is in the pharmacy      Prior to Admission medications    Medication Sig Start Date End Date Taking? Authorizing Provider   cholecalciferol (VITAMIN D3) (2,000 UNITS /50 MCG) cap capsule Take 1 Capsule by mouth daily. 10/26/21   Cristine Turner MD     Patient Active Problem List   Diagnosis Code    Cervical spondylosis without myelopathy M47.812    Cervical disc disorder with radiculopathy of cervicothoracic region M50.13    Former smoker Z87.891    Overweight (BMI 25.0-29. 9) E66.3     Current Outpatient Medications   Medication Sig Dispense Refill    cholecalciferol (VITAMIN D3) (2,000 UNITS /50 MCG) cap capsule Take 1 Capsule by mouth daily.  90 Capsule 1     No Known Allergies  Past Medical History:   Diagnosis Date    Gout      Past Surgical History:   Procedure Laterality Date    HX COLONOSCOPY      HX OTHER SURGICAL  1990s    ORAL SURGERY     Family History   Problem Relation Age of Onset    Diabetes Mother     Hypertension Mother    [facility] Gout Mother     Cancer Father         PROSTATE    Diabetes Father     Anesth Problems Neg Hx      Social History     Tobacco Use    Smoking status: Former Smoker     Packs/day: 0.50     Years: 12.00     Pack years: 6.00     Quit date: 10/14/1998     Years since quittin.0    Smokeless tobacco: Never Used   Substance Use Topics    Alcohol use: Yes     Alcohol/week: 31.0 standard drinks     Types: 31 Cans of beer per week     Comment: around 3 per days      ROS:  As per hpi    Objective:   No flowsheet data found. General: alert, cooperative, no distress   Mental  status: normal mood, behavior, speech, dress, motor activity, and thought processes, able to follow commands   HENT: NCAT   Neck: no visualized mass   Resp: no respiratory distress   Neuro: no gross deficits   Skin: no discoloration or lesions of concern on visible areas   Psychiatric: normal affect, consistent with stated mood, no evidence of hallucinations     Additional exam findings: We discussed the expected course, resolution and complications of the diagnosis(es) in detail. Medication risks, benefits, costs, interactions, and alternatives were discussed as indicated. I advised him to contact the office if his condition worsens, changes or fails to improve as anticipated. He expressed understanding with the diagnosis(es) and plan. Karmen Sykes, was evaluated through a synchronous (real-time) audio-video encounter. The patient (or guardian if applicable) is aware that this is a billable service. Verbal consent to proceed has been obtained within the past 12 months. The visit was conducted pursuant to the emergency declaration under the [address] [place] [address] authority and the [employer] and [employer] General Act. Patient identification was verified, and a caregiver was present when appropriate. The patient was located in a state where the provider was credentialed to provide care.     Kavon Saucedo MD

## 2022-03-19 PROBLEM — Z87.891 FORMER SMOKER: Status: ACTIVE | Noted: 2020-03-15

## 2022-03-19 PROBLEM — E66.3 OVERWEIGHT (BMI 25.0-29.9): Status: ACTIVE | Noted: 2020-03-15

## 2022-03-20 PROBLEM — E11.9 TYPE 2 DIABETES MELLITUS (HCC): Status: ACTIVE | Noted: 2021-10-28

## 2022-12-07 ENCOUNTER — OFFICE VISIT (OUTPATIENT)
Dept: FAMILY MEDICINE CLINIC | Age: 62
End: 2022-12-07
Payer: COMMERCIAL

## 2022-12-07 VITALS
TEMPERATURE: 97.8 F | HEIGHT: 73 IN | BODY MASS INDEX: 25.84 KG/M2 | OXYGEN SATURATION: 98 % | SYSTOLIC BLOOD PRESSURE: 123 MMHG | RESPIRATION RATE: 20 BRPM | WEIGHT: 195 LBS | DIASTOLIC BLOOD PRESSURE: 84 MMHG | HEART RATE: 77 BPM

## 2022-12-07 DIAGNOSIS — M54.6 BACK PAIN OF THORACOLUMBAR REGION: ICD-10-CM

## 2022-12-07 DIAGNOSIS — E78.5 DYSLIPIDEMIA (HIGH LDL; LOW HDL): ICD-10-CM

## 2022-12-07 DIAGNOSIS — E55.9 VITAMIN D DEFICIENCY: ICD-10-CM

## 2022-12-07 DIAGNOSIS — M54.50 BACK PAIN OF THORACOLUMBAR REGION: ICD-10-CM

## 2022-12-07 DIAGNOSIS — Z12.5 SCREENING FOR PROSTATE CANCER: ICD-10-CM

## 2022-12-07 DIAGNOSIS — E11.9 DIABETIC EYE EXAM (HCC): ICD-10-CM

## 2022-12-07 DIAGNOSIS — E11.9 ENCOUNTER FOR DIABETIC FOOT EXAM (HCC): ICD-10-CM

## 2022-12-07 DIAGNOSIS — E11.9 DIABETES MELLITUS TYPE 2, DIET-CONTROLLED (HCC): ICD-10-CM

## 2022-12-07 DIAGNOSIS — Z01.00 DIABETIC EYE EXAM (HCC): ICD-10-CM

## 2022-12-07 DIAGNOSIS — Z00.00 ENCOUNTER FOR ANNUAL PHYSICAL EXAM: Primary | ICD-10-CM

## 2022-12-07 DIAGNOSIS — R35.0 FREQUENCY OF URINATION: ICD-10-CM

## 2022-12-07 DIAGNOSIS — Z13.29 SCREENING FOR THYROID DISORDER: ICD-10-CM

## 2022-12-07 RX ORDER — COLCHICINE 0.6 MG/1
0.6 TABLET ORAL DAILY
COMMUNITY

## 2022-12-07 NOTE — PROGRESS NOTES
1. \"Have you been to the ER, urgent care clinic since your last visit? Hospitalized since your last visit? \" No    2. \"Have you seen or consulted any other health care providers outside of the [address] [place] [address] since your last visit? \" No     3. For patients aged 39-70: Has the patient had a colonoscopy / FIT/ Cologuard? Yes - no Care Gap present      If the patient is female:    4. For patients aged 41-77: Has the patient had a mammogram within the past 2 years? NA - based on age or sex      11. For patients aged 21-65: Has the patient had a pap smear?  NA - based on age or sex

## 2022-12-07 NOTE — PROGRESS NOTES
Chief Complaint   Patient presents with    Fall     X 1 MONTH AGO     HPI:  Jazz Damian is a 63 y.o. AA male presents with complaints back pain on and off following a fall 1 month ago. Pain is located in low and mid back, intermittent, worse with movement when present. He is asking for xray because pain is coming more frequently. Also, he is due for annual physical exam and blood work. Blood pressure is stable. Review of Systems  As per hpi    Past Medical History:   Diagnosis Date    Gout      Past Surgical History:   Procedure Laterality Date    HX COLONOSCOPY      HX OTHER SURGICAL  1990s    ORAL SURGERY     Social History     Socioeconomic History    Marital status:    Tobacco Use    Smoking status: Former     Packs/day: 0.50     Years: 12.00     Pack years: 6.00     Types: Cigarettes     Quit date: 10/14/1998     Years since quittin.1    Smokeless tobacco: Never   Substance and Sexual Activity    Alcohol use: Yes     Alcohol/week: 31.0 standard drinks     Types: 31 Cans of beer per week     Comment: around 3 per days     Drug use: Never    Sexual activity: Not Currently     Family History   Problem Relation Age of Onset    Diabetes Mother     Hypertension Mother     Gout Mother     Cancer Father         PROSTATE    Diabetes Father     Anesth Problems Neg Hx      Current Outpatient Medications   Medication Sig Dispense Refill    colchicine 0.6 mg tablet Take 0.6 mg by mouth daily. cholecalciferol (VITAMIN D3) (2,000 UNITS /50 MCG) cap capsule Take 1 Capsule by mouth daily.  90 Capsule 1     No Known Allergies    Objective:  Visit Vitals  /84   Pulse 77   Temp 97.8 °F (36.6 °C) (Oral)   Resp 20   Ht 6' 1\" (1.854 m)   Wt 195 lb (88.5 kg)   SpO2 98%   BMI 25.73 kg/m²     Physical Exam:   General appearance - alert, well appearing in no distress  Mental status - alert, oriented to person, place, and time  EYE-PERRL, EOMI  ENT-ENT exam normal, no neck nodes or sinus tenderness  Neck - supple, no significant adenopathy   Chest - clear to auscultation, no wheezes, rales or rhonchi   Heart - normal rate, regular rhythm, no murmurs  Abdomen - soft, nontender, nondistended, no organomegaly  Ext-peripheral pulses normal, no pedal edema  Skin-Warm and dry. no hyperpigmentation or suspicious lesions  Neuro -no focal findings   Back-limited range of motion, pain with motion noted during exam in lumbar and thoracic regions    Results for orders placed or performed in visit on 10/12/21   HEPATITIS C AB   Result Value Ref Range    Hep C Virus Ab <0.1 0.0 - 0.9 s/co ratio   METABOLIC PANEL, COMPREHENSIVE   Result Value Ref Range    Glucose 98 65 - 99 mg/dL    BUN 17 6 - 24 mg/dL    Creatinine 0.83 0.76 - 1.27 mg/dL    GFR est non-AA 96 >59 mL/min/1.73    GFR est  >59 mL/min/1.73    BUN/Creatinine ratio 20 9 - 20    Sodium 140 134 - 144 mmol/L    Potassium 5.0 3.5 - 5.2 mmol/L    Chloride 101 96 - 106 mmol/L    CO2 28 20 - 29 mmol/L    Calcium 9.7 8.7 - 10.2 mg/dL    Protein, total 7.3 6.0 - 8.5 g/dL    Albumin 5.2 (H) 3.8 - 4.9 g/dL    GLOBULIN, TOTAL 2.1 1.5 - 4.5 g/dL    A-G Ratio 2.5 (H) 1.2 - 2.2    Bilirubin, total 0.5 0.0 - 1.2 mg/dL    Alk. phosphatase 65 44 - 121 IU/L    AST (SGOT) 19 0 - 40 IU/L    ALT (SGPT) 31 0 - 44 IU/L   CBC W/O DIFF   Result Value Ref Range    WBC 6.7 3.4 - 10.8 x10E3/uL    RBC 5.40 4. 14 - 5.80 x10E6/uL    HGB 14.9 13.0 - 17.7 g/dL    HCT 46.3 37.5 - 51.0 %    MCV 86 79 - 97 fL    MCH 27.6 26.6 - 33.0 pg    MCHC 32.2 31.5 - 35.7 g/dL    RDW 12.4 11.6 - 15.4 %    PLATELET 366 880 - 167 x10E3/uL   LIPID PANEL   Result Value Ref Range    Cholesterol, total 192 100 - 199 mg/dL    Triglyceride 70 0 - 149 mg/dL    HDL Cholesterol 62 >39 mg/dL    VLDL, calculated 13 5 - 40 mg/dL    LDL, calculated 117 (H) 0 - 99 mg/dL   HEMOGLOBIN A1C WITH EAG   Result Value Ref Range    Hemoglobin A1c 6.2 (H) 4.8 - 5.6 %    Estimated average glucose 131 mg/dL   VITAMIN D, 25 HYDROXY   Result Value Ref Range    VITAMIN D, 25-HYDROXY 20.2 (L) 30.0 - 100.0 ng/mL   PSA, DIAGNOSTIC (PROSTATE SPECIFIC AG)   Result Value Ref Range    Prostate Specific Ag 1.0 0.0 - 4.0 ng/mL   URINALYSIS W/ RFLX MICROSCOPIC   Result Value Ref Range    Specific Gravity 1.024 1.005 - 1.030    pH (UA) 5.5 5.0 - 7.5    Color Yellow Yellow    Appearance Clear Clear    Leukocyte Esterase Negative Negative    Protein Negative Negative/Trace    Glucose Negative Negative    Ketone Negative Negative    Blood Negative Negative    Bilirubin Negative Negative    Urobilinogen 0.2 0.2 - 1.0 mg/dL    Nitrites Negative Negative    Microscopic Examination Comment    MICROALBUMIN, UR, RAND W/ MICROALB/CREAT RATIO   Result Value Ref Range    Creatinine, urine random 102.4 Not Estab. mg/dL    Microalbumin, urine <3.0 Not Estab. ug/mL    Microalb/Creat ratio (ug/mg creat.) <3 0 - 29 mg/g creat   TSH 3RD GENERATION   Result Value Ref Range    TSH 0.925 0.450 - 4.500 uIU/mL       Assessment/Plan:  Diagnoses and all orders for this visit:    Encounter for annual physical exam  -     METABOLIC PANEL, COMPREHENSIVE; Future  -     CBC WITH AUTOMATED DIFF; Future  -     METABOLIC PANEL, COMPREHENSIVE  -     CBC WITH AUTOMATED DIFF    Back pain of thoracolumbar region  -     XR SPINE LUMB 2 OR 3 V; Future  -     XR SPINE THORAC 3 V; Future  -     METABOLIC PANEL, COMPREHENSIVE; Future  -     CBC WITH AUTOMATED DIFF; Future  -     METABOLIC PANEL, COMPREHENSIVE  -     CBC WITH AUTOMATED DIFF    Diabetes mellitus type 2, diet-controlled (HCC)  -     METABOLIC PANEL, COMPREHENSIVE; Future  -     HEMOGLOBIN A1C WITH EAG; Future  -     MICROALBUMIN, UR, RAND W/ MICROALB/CREAT RATIO; Future  -     METABOLIC PANEL, COMPREHENSIVE  -     HEMOGLOBIN A1C WITH EAG  -     MICROALBUMIN, UR, RAND W/ MICROALB/CREAT RATIO    Dyslipidemia (high LDL; low HDL)  -     LIPID PANEL;  Future  -     LIPID PANEL    Screening for prostate cancer  -     PSA, DIAGNOSTIC (PROSTATE SPECIFIC AG); Future  -     PSA, DIAGNOSTIC (PROSTATE SPECIFIC AG)    Frequency of urination  -     URINALYSIS W/ RFLX MICROSCOPIC; Future  -     URINALYSIS W/ RFLX MICROSCOPIC    Screening for thyroid disorder  -     TSH 3RD GENERATION; Future  -     TSH 3RD GENERATION    Vitamin D deficiency  -     VITAMIN D, 25 HYDROXY; Future  -     VITAMIN D, 25 HYDROXY    Diabetic eye exam ([facility]  -     REFERRAL TO OPHTHALMOLOGY    Encounter for diabetic foot exam ([facility]  -     REFERRAL TO PODIATRY      Follow-up and Dispositions    Return in about 4 weeks (around 1/4/2023) for follow up results.

## 2022-12-09 ENCOUNTER — HOSPITAL ENCOUNTER (OUTPATIENT)
Dept: GENERAL RADIOLOGY | Age: 62
End: 2022-12-09
Payer: COMMERCIAL

## 2022-12-09 DIAGNOSIS — M54.50 BACK PAIN OF THORACOLUMBAR REGION: ICD-10-CM

## 2022-12-09 DIAGNOSIS — M54.6 BACK PAIN OF THORACOLUMBAR REGION: ICD-10-CM

## 2022-12-09 PROCEDURE — 72072 X-RAY EXAM THORAC SPINE 3VWS: CPT

## 2022-12-09 PROCEDURE — 72100 X-RAY EXAM L-S SPINE 2/3 VWS: CPT

## 2022-12-10 LAB
25(OH)D3+25(OH)D2 SERPL-MCNC: 14.1 NG/ML (ref 30–100)
ALBUMIN SERPL-MCNC: 5.1 G/DL (ref 3.8–4.9)
ALBUMIN/CREAT UR: 3 MG/G CREAT (ref 0–29)
ALBUMIN/GLOB SERPL: 2.4 {RATIO} (ref 1.2–2.2)
ALP SERPL-CCNC: 54 IU/L (ref 44–121)
ALT SERPL-CCNC: 24 IU/L (ref 0–44)
APPEARANCE UR: CLEAR
AST SERPL-CCNC: 19 IU/L (ref 0–40)
BASOPHILS # BLD AUTO: 0 X10E3/UL (ref 0–0.2)
BASOPHILS NFR BLD AUTO: 1 %
BILIRUB SERPL-MCNC: 0.5 MG/DL (ref 0–1.2)
BILIRUB UR QL STRIP: NEGATIVE
BUN SERPL-MCNC: 16 MG/DL (ref 8–27)
BUN/CREAT SERPL: 19 (ref 10–24)
CALCIUM SERPL-MCNC: 9.7 MG/DL (ref 8.6–10.2)
CHLORIDE SERPL-SCNC: 100 MMOL/L (ref 96–106)
CHOLEST SERPL-MCNC: 184 MG/DL (ref 100–199)
CO2 SERPL-SCNC: 27 MMOL/L (ref 20–29)
COLOR UR: YELLOW
CREAT SERPL-MCNC: 0.86 MG/DL (ref 0.76–1.27)
CREAT UR-MCNC: 141.1 MG/DL
EGFR: 99 ML/MIN/1.73
EOSINOPHIL # BLD AUTO: 0.1 X10E3/UL (ref 0–0.4)
EOSINOPHIL NFR BLD AUTO: 2 %
ERYTHROCYTE [DISTWIDTH] IN BLOOD BY AUTOMATED COUNT: 12.1 % (ref 11.6–15.4)
EST. AVERAGE GLUCOSE BLD GHB EST-MCNC: 131 MG/DL
GLOBULIN SER CALC-MCNC: 2.1 G/DL (ref 1.5–4.5)
GLUCOSE SERPL-MCNC: 108 MG/DL (ref 70–99)
GLUCOSE UR QL STRIP: NEGATIVE
HBA1C MFR BLD: 6.2 % (ref 4.8–5.6)
HCT VFR BLD AUTO: 46.6 % (ref 37.5–51)
HDLC SERPL-MCNC: 64 MG/DL
HGB BLD-MCNC: 15 G/DL (ref 13–17.7)
HGB UR QL STRIP: NEGATIVE
IMM GRANULOCYTES # BLD AUTO: 0 X10E3/UL (ref 0–0.1)
IMM GRANULOCYTES NFR BLD AUTO: 0 %
KETONES UR QL STRIP: NEGATIVE
LDLC SERPL CALC-MCNC: 112 MG/DL (ref 0–99)
LEUKOCYTE ESTERASE UR QL STRIP: NEGATIVE
LYMPHOCYTES # BLD AUTO: 1.7 X10E3/UL (ref 0.7–3.1)
LYMPHOCYTES NFR BLD AUTO: 30 %
MCH RBC QN AUTO: 27.2 PG (ref 26.6–33)
MCHC RBC AUTO-ENTMCNC: 32.2 G/DL (ref 31.5–35.7)
MCV RBC AUTO: 85 FL (ref 79–97)
MICRO URNS: NORMAL
MICROALBUMIN UR-MCNC: 4.1 UG/ML
MONOCYTES # BLD AUTO: 0.5 X10E3/UL (ref 0.1–0.9)
MONOCYTES NFR BLD AUTO: 9 %
NEUTROPHILS # BLD AUTO: 3.3 X10E3/UL (ref 1.4–7)
NEUTROPHILS NFR BLD AUTO: 58 %
NITRITE UR QL STRIP: NEGATIVE
PH UR STRIP: 6.5 [PH] (ref 5–7.5)
PLATELET # BLD AUTO: 291 X10E3/UL (ref 150–450)
POTASSIUM SERPL-SCNC: 4.8 MMOL/L (ref 3.5–5.2)
PROT SERPL-MCNC: 7.2 G/DL (ref 6–8.5)
PROT UR QL STRIP: NEGATIVE
PSA SERPL-MCNC: 1 NG/ML (ref 0–4)
RBC # BLD AUTO: 5.51 X10E6/UL (ref 4.14–5.8)
SODIUM SERPL-SCNC: 144 MMOL/L (ref 134–144)
SP GR UR STRIP: 1.02 (ref 1–1.03)
TRIGL SERPL-MCNC: 37 MG/DL (ref 0–149)
TSH SERPL DL<=0.005 MIU/L-ACNC: 0.64 UIU/ML (ref 0.45–4.5)
UROBILINOGEN UR STRIP-MCNC: 0.2 MG/DL (ref 0.2–1)
VLDLC SERPL CALC-MCNC: 8 MG/DL (ref 5–40)
WBC # BLD AUTO: 5.6 X10E3/UL (ref 3.4–10.8)

## 2022-12-18 DIAGNOSIS — E55.9 VITAMIN D DEFICIENCY: ICD-10-CM

## 2022-12-18 RX ORDER — ACETAMINOPHEN 500 MG
2000 TABLET ORAL DAILY
Qty: 90 CAPSULE | Refills: 1 | Status: SHIPPED | OUTPATIENT
Start: 2022-12-18

## 2023-08-16 NOTE — TELEPHONE ENCOUNTER
Pt would like a refill for:     colchicine (COLCRYS) 0.6 MG tablet    Gout in toe     [facility] [address] number to reach him is [phone]

## 2023-08-18 RX ORDER — COLCHICINE 0.6 MG/1
0.6 TABLET ORAL DAILY
Qty: 30 TABLET | Refills: 1 | Status: SHIPPED | OUTPATIENT
Start: 2023-08-18

## 2023-09-12 ENCOUNTER — OFFICE VISIT (OUTPATIENT)
Age: 63
End: 2023-09-12
Payer: COMMERCIAL

## 2023-09-12 VITALS
SYSTOLIC BLOOD PRESSURE: 122 MMHG | OXYGEN SATURATION: 92 % | DIASTOLIC BLOOD PRESSURE: 81 MMHG | HEART RATE: 75 BPM | HEIGHT: 73 IN | BODY MASS INDEX: 25.84 KG/M2 | TEMPERATURE: 97.5 F | RESPIRATION RATE: 20 BRPM | WEIGHT: 195 LBS

## 2023-09-12 DIAGNOSIS — S60.469D: Primary | ICD-10-CM

## 2023-09-12 DIAGNOSIS — L08.9: Primary | ICD-10-CM
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
## 2023-09-12 PROCEDURE — 99213 OFFICE O/P EST LOW 20 MIN: CPT | Performed by: INTERNAL MEDICINE

## 2023-09-12 SDOH — ECONOMIC STABILITY: FOOD INSECURITY: WITHIN THE PAST 12 MONTHS, YOU WORRIED THAT YOUR FOOD WOULD RUN OUT BEFORE YOU GOT MONEY TO BUY MORE.: NEVER TRUE

## 2023-09-12 SDOH — ECONOMIC STABILITY: FOOD INSECURITY: WITHIN THE PAST 12 MONTHS, THE FOOD YOU BOUGHT JUST DIDN'T LAST AND YOU DIDN'T HAVE MONEY TO GET MORE.: NEVER TRUE

## 2023-09-12 SDOH — ECONOMIC STABILITY: INCOME INSECURITY: HOW HARD IS IT FOR YOU TO PAY FOR THE VERY BASICS LIKE FOOD, HOUSING, MEDICAL CARE, AND HEATING?: NOT HARD AT ALL

## 2023-09-12 SDOH — ECONOMIC STABILITY: HOUSING INSECURITY
IN THE LAST 12 MONTHS, WAS THERE A TIME WHEN YOU DID NOT HAVE A STEADY PLACE TO SLEEP OR SLEPT IN A SHELTER (INCLUDING NOW)?: NO

## 2023-09-12 ASSESSMENT — PATIENT HEALTH QUESTIONNAIRE - PHQ9
SUM OF ALL RESPONSES TO PHQ QUESTIONS 1-9: 0
SUM OF ALL RESPONSES TO PHQ9 QUESTIONS 1 & 2: 0
1. LITTLE INTEREST OR PLEASURE IN DOING THINGS: 0
2. FEELING DOWN, DEPRESSED OR HOPELESS: 0
SUM OF ALL RESPONSES TO PHQ QUESTIONS 1-9: 0

## 2023-09-12 ASSESSMENT — ANXIETY QUESTIONNAIRES
1. FEELING NERVOUS, ANXIOUS, OR ON EDGE: 0
4. TROUBLE RELAXING: 0
GAD7 TOTAL SCORE: 0
7. FEELING AFRAID AS IF SOMETHING AWFUL MIGHT HAPPEN: 0
3. WORRYING TOO MUCH ABOUT DIFFERENT THINGS: 0
6. BECOMING EASILY ANNOYED OR IRRITABLE: 0
2. NOT BEING ABLE TO STOP OR CONTROL WORRYING: 0
5. BEING SO RESTLESS THAT IT IS HARD TO SIT STILL: 0
IF YOU CHECKED OFF ANY PROBLEMS ON THIS QUESTIONNAIRE, HOW DIFFICULT HAVE THESE PROBLEMS MADE IT FOR YOU TO DO YOUR WORK, TAKE CARE OF THINGS AT HOME, OR GET ALONG WITH OTHER PEOPLE: NOT DIFFICULT AT ALL

## 2023-09-12 NOTE — PROGRESS NOTES
Chief Complaint   Patient presents with    Follow-up     Had bug bites      HPI:  Natalie Hill is a 66 y.o. AA male presents for ER follow-up for bug bites. Had insect bite on 23, was treated with antibiotics. The lesions have healed. Review of Systems  As per hpi    Past Medical History:   Diagnosis Date    Gout      Past Surgical History:   Procedure Laterality Date    COLONOSCOPY      OTHER SURGICAL HISTORY  1990s    ORAL SURGERY     Social History     Socioeconomic History    Marital status:      Spouse name: None    Number of children: None    Years of education: None    Highest education level: None   Tobacco Use    Smoking status: Former     Packs/day: .5     Types: Cigarettes     Quit date: 10/14/1998     Years since quittin.9    Smokeless tobacco: Never   Substance and Sexual Activity    Alcohol use:  Yes     Alcohol/week: 31.0 standard drinks of alcohol    Drug use: Never     Social Determinants of Health     Financial Resource Strain: Low Risk  (2023)    Overall Financial Resource Strain (CARDIA)     Difficulty of Paying Living Expenses: Not hard at all   Food Insecurity: No Food Insecurity (2023)    Hunger Vital Sign     Worried About Running Out of Food in the Last Year: Never true     Ran Out of Food in the Last Year: Never true   Transportation Needs: Unknown (2023)    PRAPARE - Transportation     Lack of Transportation (Non-Medical): No   Housing Stability: Unknown (2023)    Housing Stability Vital Sign     Unstable Housing in the Last Year: No     Family History   Problem Relation Age of Onset    Diabetes Father     Anesth Problems Neg Hx     Diabetes Mother     Cancer Father         PROSTATE    Gout Mother     Hypertension Mother      Current Outpatient Medications   Medication Sig Dispense Refill    colchicine (COLCRYS) 0.6 MG tablet Take 1 tablet by mouth daily 30 tablet 1    Cholecalciferol 50 MCG (2000) CAPS Take 1 capsule by mouth daily       No

## 2023-09-12 NOTE — PROGRESS NOTES
Chief Complaint   Patient presents with    Follow-up     Had bug bites      1. Have you been to the ER, urgent care clinic since your last visit? Hospitalized since your last visit? Was seen at [facility]    2. Have you seen or consulted any other health care providers outside of the [address] [place] [address] Avenue since your last visit? Include any pap smears or colon screening.  No

## 2023-11-24 ENCOUNTER — TELEPHONE (OUTPATIENT)
Dept: PRIMARY CARE CLINIC | Facility: CLINIC | Age: 63
End: 2023-11-24

## 2023-11-24 NOTE — TELEPHONE ENCOUNTER
Called patient to rescheduled appointment for Dec 8th to different day provider out of office for meeting.

## 2023-12-19 DIAGNOSIS — E11.9 DIET-CONTROLLED DIABETES MELLITUS (HCC): ICD-10-CM

## 2023-12-19 DIAGNOSIS — Z00.00 PHYSICAL EXAM: ICD-10-CM

## 2023-12-19 DIAGNOSIS — Z11.4 ENCOUNTER FOR SCREENING FOR HIV: ICD-10-CM

## 2023-12-19 DIAGNOSIS — E55.9 VITAMIN D DEFICIENCY: ICD-10-CM

## 2023-12-19 DIAGNOSIS — M1A.0710 IDIOPATHIC CHRONIC GOUT OF RIGHT FOOT WITHOUT TOPHUS: ICD-10-CM

## 2023-12-20 LAB
25(OH)D3+25(OH)D2 SERPL-MCNC: 15.3 NG/ML (ref 30–100)
ALBUMIN SERPL-MCNC: 4.9 G/DL (ref 3.9–4.9)
ALBUMIN/CREAT UR: 2 MG/G CREAT (ref 0–29)
ALBUMIN/GLOB SERPL: 2.6 {RATIO} (ref 1.2–2.2)
ALP SERPL-CCNC: 43 IU/L (ref 44–121)
ALT SERPL-CCNC: 29 IU/L (ref 0–44)
AST SERPL-CCNC: 22 IU/L (ref 0–40)
BILIRUB SERPL-MCNC: 1 MG/DL (ref 0–1.2)
BUN SERPL-MCNC: 12 MG/DL (ref 8–27)
BUN/CREAT SERPL: 14 (ref 10–24)
CALCIUM SERPL-MCNC: 9.3 MG/DL (ref 8.6–10.2)
CHLORIDE SERPL-SCNC: 101 MMOL/L (ref 96–106)
CHOLEST SERPL-MCNC: 179 MG/DL (ref 100–199)
CO2 SERPL-SCNC: 24 MMOL/L (ref 20–29)
CREAT SERPL-MCNC: 0.83 MG/DL (ref 0.76–1.27)
CREAT UR-MCNC: 176 MG/DL
EGFRCR SERPLBLD CKD-EPI 2021: 100 ML/MIN/1.73
ERYTHROCYTE [DISTWIDTH] IN BLOOD BY AUTOMATED COUNT: 12.1 % (ref 11.6–15.4)
GLOBULIN SER CALC-MCNC: 1.9 G/DL (ref 1.5–4.5)
GLUCOSE SERPL-MCNC: 112 MG/DL (ref 70–99)
HBA1C MFR BLD: 6.5 % (ref 4.8–5.6)
HCT VFR BLD AUTO: 45.1 % (ref 37.5–51)
HDLC SERPL-MCNC: 65 MG/DL
HGB BLD-MCNC: 14.6 G/DL (ref 13–17.7)
HIV 1+2 AB+HIV1 P24 AG SERPL QL IA: NON REACTIVE
LDLC SERPL CALC-MCNC: 102 MG/DL (ref 0–99)
MCH RBC QN AUTO: 27.9 PG (ref 26.6–33)
MCHC RBC AUTO-ENTMCNC: 32.4 G/DL (ref 31.5–35.7)
MCV RBC AUTO: 86 FL (ref 79–97)
MICROALBUMIN UR-MCNC: 3.7 UG/ML
PLATELET # BLD AUTO: 271 X10E3/UL (ref 150–450)
POTASSIUM SERPL-SCNC: 4.1 MMOL/L (ref 3.5–5.2)
PROT SERPL-MCNC: 6.8 G/DL (ref 6–8.5)
PSA SERPL-MCNC: 1.1 NG/ML (ref 0–4)
RBC # BLD AUTO: 5.24 X10E6/UL (ref 4.14–5.8)
SODIUM SERPL-SCNC: 142 MMOL/L (ref 134–144)
TRIGL SERPL-MCNC: 62 MG/DL (ref 0–149)
TSH SERPL DL<=0.005 MIU/L-ACNC: 0.89 UIU/ML (ref 0.45–4.5)
URATE SERPL-MCNC: 7.1 MG/DL (ref 3.8–8.4)
VLDLC SERPL CALC-MCNC: 12 MG/DL (ref 5–40)
WBC # BLD AUTO: 6.4 X10E3/UL (ref 3.4–10.8)

## 2024-05-03 ENCOUNTER — HOSPITAL ENCOUNTER (INPATIENT)
Facility: HOSPITAL | Age: 64
LOS: 3 days | Discharge: HOME | DRG: 310 | End: 2024-05-06
Attending: STUDENT IN AN ORGANIZED HEALTH CARE EDUCATION/TRAINING PROGRAM | Admitting: INTERNAL MEDICINE
Payer: COMMERCIAL

## 2024-05-03 ENCOUNTER — TELEPHONE (OUTPATIENT)
Dept: PRIMARY CARE CLINIC | Facility: CLINIC | Age: 64
End: 2024-05-03

## 2024-05-03 ENCOUNTER — APPOINTMENT (OUTPATIENT)
Facility: HOSPITAL | Age: 64
DRG: 310 | End: 2024-05-03
Payer: COMMERCIAL

## 2024-05-03 DIAGNOSIS — I48.91 ATRIAL FIBRILLATION WITH RAPID VENTRICULAR RESPONSE (HCC): Primary | ICD-10-CM

## 2024-05-03 LAB
ALBUMIN SERPL-MCNC: 4.4 G/DL (ref 3.5–5)
ALBUMIN/GLOB SERPL: 1.3 (ref 1.1–2.2)
ALP SERPL-CCNC: 57 U/L (ref 45–117)
ALT SERPL-CCNC: 51 U/L (ref 12–78)
ANION GAP SERPL CALC-SCNC: 10 MMOL/L (ref 5–15)
APTT PPP: 25.5 SEC (ref 22.1–31)
AST SERPL-CCNC: 42 U/L (ref 15–37)
BASOPHILS # BLD: 0 K/UL (ref 0–0.1)
BASOPHILS NFR BLD: 1 % (ref 0–1)
BILIRUB SERPL-MCNC: 0.8 MG/DL (ref 0.2–1)
BUN SERPL-MCNC: 17 MG/DL (ref 6–20)
BUN/CREAT SERPL: 15 (ref 12–20)
CALCIUM SERPL-MCNC: 9.7 MG/DL (ref 8.5–10.1)
CHLORIDE SERPL-SCNC: 109 MMOL/L (ref 97–108)
CO2 SERPL-SCNC: 23 MMOL/L (ref 21–32)
CREAT SERPL-MCNC: 1.12 MG/DL (ref 0.7–1.3)
D DIMER PPP FEU-MCNC: 0.51 MG/L FEU (ref 0–0.65)
DIFFERENTIAL METHOD BLD: NORMAL
EOSINOPHIL # BLD: 0.1 K/UL (ref 0–0.4)
EOSINOPHIL NFR BLD: 1 % (ref 0–7)
ERYTHROCYTE [DISTWIDTH] IN BLOOD BY AUTOMATED COUNT: 12.1 % (ref 11.5–14.5)
GLOBULIN SER CALC-MCNC: 3.4 G/DL (ref 2–4)
GLUCOSE SERPL-MCNC: 143 MG/DL (ref 65–100)
HCT VFR BLD AUTO: 44.7 % (ref 36.6–50.3)
HGB BLD-MCNC: 15 G/DL (ref 12.1–17)
IMM GRANULOCYTES # BLD AUTO: 0 K/UL (ref 0–0.04)
IMM GRANULOCYTES NFR BLD AUTO: 0 % (ref 0–0.5)
INR PPP: 1 (ref 0.9–1.1)
LIPASE SERPL-CCNC: 19 U/L (ref 13–75)
LYMPHOCYTES # BLD: 1.6 K/UL (ref 0.8–3.5)
LYMPHOCYTES NFR BLD: 24 % (ref 12–49)
MAGNESIUM SERPL-MCNC: 2.1 MG/DL (ref 1.6–2.4)
MCH RBC QN AUTO: 27.8 PG (ref 26–34)
MCHC RBC AUTO-ENTMCNC: 33.6 G/DL (ref 30–36.5)
MCV RBC AUTO: 82.8 FL (ref 80–99)
MONOCYTES # BLD: 0.8 K/UL (ref 0–1)
MONOCYTES NFR BLD: 11 % (ref 5–13)
NEUTS SEG # BLD: 4.4 K/UL (ref 1.8–8)
NEUTS SEG NFR BLD: 63 % (ref 32–75)
NRBC # BLD: 0 K/UL (ref 0–0.01)
NRBC BLD-RTO: 0 PER 100 WBC
PLATELET # BLD AUTO: 304 K/UL (ref 150–400)
PMV BLD AUTO: 10.3 FL (ref 8.9–12.9)
POTASSIUM SERPL-SCNC: 3.6 MMOL/L (ref 3.5–5.1)
PROT SERPL-MCNC: 7.8 G/DL (ref 6.4–8.2)
PROTHROMBIN TIME: 10.8 SEC (ref 9–11.1)
RBC # BLD AUTO: 5.4 M/UL (ref 4.1–5.7)
SODIUM SERPL-SCNC: 142 MMOL/L (ref 136–145)
THERAPEUTIC RANGE: NORMAL SECS (ref 58–77)
TROPONIN I SERPL HS-MCNC: 48 NG/L (ref 0–76)
TSH SERPL DL<=0.05 MIU/L-ACNC: 0.6 UIU/ML (ref 0.36–3.74)
UFH PPP CHRO-ACNC: <0.1 IU/ML
WBC # BLD AUTO: 6.9 K/UL (ref 4.1–11.1)

## 2024-05-03 PROCEDURE — 83690 ASSAY OF LIPASE: CPT

## 2024-05-03 PROCEDURE — 84484 ASSAY OF TROPONIN QUANT: CPT

## 2024-05-03 PROCEDURE — 2580000003 HC RX 258: Performed by: INTERNAL MEDICINE

## 2024-05-03 PROCEDURE — 85610 PROTHROMBIN TIME: CPT

## 2024-05-03 PROCEDURE — 96366 THER/PROPH/DIAG IV INF ADDON: CPT

## 2024-05-03 PROCEDURE — 85730 THROMBOPLASTIN TIME PARTIAL: CPT

## 2024-05-03 PROCEDURE — 6360000002 HC RX W HCPCS: Performed by: STUDENT IN AN ORGANIZED HEALTH CARE EDUCATION/TRAINING PROGRAM

## 2024-05-03 PROCEDURE — 2500000003 HC RX 250 WO HCPCS: Performed by: STUDENT IN AN ORGANIZED HEALTH CARE EDUCATION/TRAINING PROGRAM

## 2024-05-03 PROCEDURE — 2060000000 HC ICU INTERMEDIATE R&B

## 2024-05-03 PROCEDURE — 96365 THER/PROPH/DIAG IV INF INIT: CPT

## 2024-05-03 PROCEDURE — 2580000003 HC RX 258: Performed by: STUDENT IN AN ORGANIZED HEALTH CARE EDUCATION/TRAINING PROGRAM

## 2024-05-03 PROCEDURE — 96376 TX/PRO/DX INJ SAME DRUG ADON: CPT

## 2024-05-03 PROCEDURE — 99285 EMERGENCY DEPT VISIT HI MDM: CPT

## 2024-05-03 PROCEDURE — 84443 ASSAY THYROID STIM HORMONE: CPT

## 2024-05-03 PROCEDURE — 2500000003 HC RX 250 WO HCPCS: Performed by: INTERNAL MEDICINE

## 2024-05-03 PROCEDURE — 85379 FIBRIN DEGRADATION QUANT: CPT

## 2024-05-03 PROCEDURE — 71045 X-RAY EXAM CHEST 1 VIEW: CPT

## 2024-05-03 PROCEDURE — 83735 ASSAY OF MAGNESIUM: CPT

## 2024-05-03 PROCEDURE — 80053 COMPREHEN METABOLIC PANEL: CPT

## 2024-05-03 PROCEDURE — 85520 HEPARIN ASSAY: CPT

## 2024-05-03 PROCEDURE — 36415 COLL VENOUS BLD VENIPUNCTURE: CPT

## 2024-05-03 PROCEDURE — 85025 COMPLETE CBC W/AUTO DIFF WBC: CPT

## 2024-05-03 PROCEDURE — 6370000000 HC RX 637 (ALT 250 FOR IP): Performed by: INTERNAL MEDICINE

## 2024-05-03 RX ORDER — ACETAMINOPHEN 325 MG/1
650 TABLET ORAL EVERY 4 HOURS PRN
Status: DISCONTINUED | OUTPATIENT
Start: 2024-05-03 | End: 2024-05-05

## 2024-05-03 RX ORDER — SODIUM CHLORIDE 0.9 % (FLUSH) 0.9 %
5-40 SYRINGE (ML) INJECTION PRN
Status: CANCELLED | OUTPATIENT
Start: 2024-05-03

## 2024-05-03 RX ORDER — SODIUM CHLORIDE 9 MG/ML
INJECTION, SOLUTION INTRAVENOUS PRN
Status: DISCONTINUED | OUTPATIENT
Start: 2024-05-03 | End: 2024-05-06 | Stop reason: HOSPADM

## 2024-05-03 RX ORDER — SODIUM CHLORIDE 9 MG/ML
INJECTION, SOLUTION INTRAVENOUS CONTINUOUS
Status: DISCONTINUED | OUTPATIENT
Start: 2024-05-03 | End: 2024-05-05

## 2024-05-03 RX ORDER — SODIUM CHLORIDE 0.9 % (FLUSH) 0.9 %
5-40 SYRINGE (ML) INJECTION EVERY 12 HOURS SCHEDULED
Status: DISCONTINUED | OUTPATIENT
Start: 2024-05-03 | End: 2024-05-06 | Stop reason: HOSPADM

## 2024-05-03 RX ORDER — SODIUM CHLORIDE 0.9 % (FLUSH) 0.9 %
5-40 SYRINGE (ML) INJECTION PRN
Status: DISCONTINUED | OUTPATIENT
Start: 2024-05-03 | End: 2024-05-06 | Stop reason: HOSPADM

## 2024-05-03 RX ORDER — HEPARIN SODIUM 1000 [USP'U]/ML
4000 INJECTION, SOLUTION INTRAVENOUS; SUBCUTANEOUS ONCE
Status: COMPLETED | OUTPATIENT
Start: 2024-05-03 | End: 2024-05-03

## 2024-05-03 RX ORDER — LORAZEPAM 2 MG/ML
4 INJECTION INTRAMUSCULAR
Status: DISCONTINUED | OUTPATIENT
Start: 2024-05-03 | End: 2024-05-06 | Stop reason: HOSPADM

## 2024-05-03 RX ORDER — LORAZEPAM 1 MG/1
1 TABLET ORAL
Status: DISCONTINUED | OUTPATIENT
Start: 2024-05-03 | End: 2024-05-06 | Stop reason: HOSPADM

## 2024-05-03 RX ORDER — ONDANSETRON 4 MG/1
4 TABLET, ORALLY DISINTEGRATING ORAL EVERY 8 HOURS PRN
Status: DISCONTINUED | OUTPATIENT
Start: 2024-05-03 | End: 2024-05-06 | Stop reason: HOSPADM

## 2024-05-03 RX ORDER — ALLOPURINOL 100 MG/1
100 TABLET ORAL DAILY
Status: DISCONTINUED | OUTPATIENT
Start: 2024-05-03 | End: 2024-05-06 | Stop reason: HOSPADM

## 2024-05-03 RX ORDER — SODIUM CHLORIDE 9 MG/ML
INJECTION, SOLUTION INTRAVENOUS PRN
Status: CANCELLED | OUTPATIENT
Start: 2024-05-03

## 2024-05-03 RX ORDER — LORAZEPAM 1 MG/1
2 TABLET ORAL
Status: DISCONTINUED | OUTPATIENT
Start: 2024-05-03 | End: 2024-05-06 | Stop reason: HOSPADM

## 2024-05-03 RX ORDER — HEPARIN SODIUM 1000 [USP'U]/ML
2000 INJECTION, SOLUTION INTRAVENOUS; SUBCUTANEOUS PRN
Status: DISCONTINUED | OUTPATIENT
Start: 2024-05-03 | End: 2024-05-06

## 2024-05-03 RX ORDER — COLCHICINE 0.6 MG/1
0.6 TABLET ORAL DAILY
Status: DISCONTINUED | OUTPATIENT
Start: 2024-05-03 | End: 2024-05-06 | Stop reason: HOSPADM

## 2024-05-03 RX ORDER — DILTIAZEM HYDROCHLORIDE 5 MG/ML
10 INJECTION INTRAVENOUS
Status: COMPLETED | OUTPATIENT
Start: 2024-05-03 | End: 2024-05-03

## 2024-05-03 RX ORDER — LORAZEPAM 2 MG/ML
2 INJECTION INTRAMUSCULAR
Status: DISCONTINUED | OUTPATIENT
Start: 2024-05-03 | End: 2024-05-06 | Stop reason: HOSPADM

## 2024-05-03 RX ORDER — ACETAMINOPHEN 325 MG/1
650 TABLET ORAL EVERY 6 HOURS PRN
Status: DISCONTINUED | OUTPATIENT
Start: 2024-05-03 | End: 2024-05-06 | Stop reason: HOSPADM

## 2024-05-03 RX ORDER — POTASSIUM CHLORIDE 20 MEQ/1
40 TABLET, EXTENDED RELEASE ORAL PRN
Status: CANCELLED | OUTPATIENT
Start: 2024-05-03

## 2024-05-03 RX ORDER — LORAZEPAM 2 MG/ML
3 INJECTION INTRAMUSCULAR
Status: DISCONTINUED | OUTPATIENT
Start: 2024-05-03 | End: 2024-05-06 | Stop reason: HOSPADM

## 2024-05-03 RX ORDER — MAGNESIUM SULFATE IN WATER 40 MG/ML
2000 INJECTION, SOLUTION INTRAVENOUS PRN
Status: CANCELLED | OUTPATIENT
Start: 2024-05-03

## 2024-05-03 RX ORDER — GAUZE BANDAGE 2" X 2"
100 BANDAGE TOPICAL DAILY
Status: DISCONTINUED | OUTPATIENT
Start: 2024-05-03 | End: 2024-05-06 | Stop reason: HOSPADM

## 2024-05-03 RX ORDER — ONDANSETRON 2 MG/ML
4 INJECTION INTRAMUSCULAR; INTRAVENOUS EVERY 6 HOURS PRN
Status: DISCONTINUED | OUTPATIENT
Start: 2024-05-03 | End: 2024-05-06 | Stop reason: HOSPADM

## 2024-05-03 RX ORDER — POTASSIUM CHLORIDE 7.45 MG/ML
10 INJECTION INTRAVENOUS PRN
Status: CANCELLED | OUTPATIENT
Start: 2024-05-03

## 2024-05-03 RX ORDER — LORAZEPAM 1 MG/1
3 TABLET ORAL
Status: DISCONTINUED | OUTPATIENT
Start: 2024-05-03 | End: 2024-05-06 | Stop reason: HOSPADM

## 2024-05-03 RX ORDER — ONDANSETRON 2 MG/ML
4 INJECTION INTRAMUSCULAR; INTRAVENOUS EVERY 4 HOURS PRN
Status: DISCONTINUED | OUTPATIENT
Start: 2024-05-03 | End: 2024-05-05

## 2024-05-03 RX ORDER — OXYCODONE HYDROCHLORIDE 5 MG/1
5 TABLET ORAL EVERY 4 HOURS PRN
Status: DISCONTINUED | OUTPATIENT
Start: 2024-05-03 | End: 2024-05-06 | Stop reason: HOSPADM

## 2024-05-03 RX ORDER — LORAZEPAM 2 MG/ML
1 INJECTION INTRAMUSCULAR
Status: DISCONTINUED | OUTPATIENT
Start: 2024-05-03 | End: 2024-05-06 | Stop reason: HOSPADM

## 2024-05-03 RX ORDER — HEPARIN SODIUM 1000 [USP'U]/ML
4000 INJECTION, SOLUTION INTRAVENOUS; SUBCUTANEOUS PRN
Status: DISCONTINUED | OUTPATIENT
Start: 2024-05-03 | End: 2024-05-06

## 2024-05-03 RX ORDER — HEPARIN SODIUM 10000 [USP'U]/100ML
5-30 INJECTION, SOLUTION INTRAVENOUS CONTINUOUS
Status: DISCONTINUED | OUTPATIENT
Start: 2024-05-03 | End: 2024-05-06

## 2024-05-03 RX ORDER — SODIUM CHLORIDE 0.9 % (FLUSH) 0.9 %
5-40 SYRINGE (ML) INJECTION EVERY 12 HOURS SCHEDULED
Status: CANCELLED | OUTPATIENT
Start: 2024-05-03

## 2024-05-03 RX ORDER — POLYETHYLENE GLYCOL 3350 17 G/17G
17 POWDER, FOR SOLUTION ORAL DAILY PRN
Status: DISCONTINUED | OUTPATIENT
Start: 2024-05-03 | End: 2024-05-06 | Stop reason: HOSPADM

## 2024-05-03 RX ORDER — ENOXAPARIN SODIUM 100 MG/ML
40 INJECTION SUBCUTANEOUS DAILY
Status: DISCONTINUED | OUTPATIENT
Start: 2024-05-03 | End: 2024-05-03

## 2024-05-03 RX ORDER — ACETAMINOPHEN 650 MG/1
650 SUPPOSITORY RECTAL EVERY 6 HOURS PRN
Status: DISCONTINUED | OUTPATIENT
Start: 2024-05-03 | End: 2024-05-06 | Stop reason: HOSPADM

## 2024-05-03 RX ORDER — LORAZEPAM 1 MG/1
4 TABLET ORAL
Status: DISCONTINUED | OUTPATIENT
Start: 2024-05-03 | End: 2024-05-06 | Stop reason: HOSPADM

## 2024-05-03 RX ADMIN — SODIUM CHLORIDE 15 MG/HR: 900 INJECTION, SOLUTION INTRAVENOUS at 15:58

## 2024-05-03 RX ADMIN — DILTIAZEM HYDROCHLORIDE 10 MG: 5 INJECTION INTRAVENOUS at 14:20

## 2024-05-03 RX ADMIN — COLCHICINE 0.6 MG: 0.6 TABLET, FILM COATED ORAL at 19:17

## 2024-05-03 RX ADMIN — Medication 100 MG: at 19:17

## 2024-05-03 RX ADMIN — SODIUM CHLORIDE: 9 INJECTION, SOLUTION INTRAVENOUS at 19:20

## 2024-05-03 RX ADMIN — HEPARIN SODIUM 4000 UNITS: 1000 INJECTION INTRAVENOUS; SUBCUTANEOUS at 19:11

## 2024-05-03 RX ADMIN — SODIUM CHLORIDE 15 MG/HR: 900 INJECTION, SOLUTION INTRAVENOUS at 20:48

## 2024-05-03 RX ADMIN — ALLOPURINOL 100 MG: 100 TABLET ORAL at 19:17

## 2024-05-03 RX ADMIN — HEPARIN SODIUM AND DEXTROSE 11 UNITS/KG/HR: 10000; 5 INJECTION INTRAVENOUS at 19:17

## 2024-05-03 RX ADMIN — SODIUM CHLORIDE, PRESERVATIVE FREE 10 ML: 5 INJECTION INTRAVENOUS at 20:18

## 2024-05-03 RX ADMIN — SODIUM CHLORIDE 5 MG/HR: 900 INJECTION, SOLUTION INTRAVENOUS at 14:29

## 2024-05-03 ASSESSMENT — PAIN SCALES - GENERAL
PAINLEVEL_OUTOF10: 0
PAINLEVEL_OUTOF10: 6

## 2024-05-03 ASSESSMENT — LIFESTYLE VARIABLES
HOW MANY STANDARD DRINKS CONTAINING ALCOHOL DO YOU HAVE ON A TYPICAL DAY: 3 OR 4
HOW OFTEN DO YOU HAVE A DRINK CONTAINING ALCOHOL: 4 OR MORE TIMES A WEEK

## 2024-05-03 NOTE — TELEPHONE ENCOUNTER
Patients wife is calling to let Dr To know he is being admitted to the hospital with a racing heart and elevated bp

## 2024-05-03 NOTE — ED PROVIDER NOTES
EMERGENCY DEPARTMENT ENCOUNTER       Pt Name: Gomez Alex  MRN: [id number]  Birthdate 1960  Date of evaluation: 5/3/2024  Provider: Davion Dickerson MD   PCP: Simi To MD  Note Started: 7:49 AM 5/3/24     CHIEF COMPLAINT       Chief Complaint   Patient presents with    Tachycardia     Pt reports he was working outside in the heat and felt his hear tracing, he drank a bottle of water and drove himself hear, reports regular alcohol consumption        HISTORY OF PRESENT ILLNESS: 1 or more elements      History From: Patient  None     Gomez Alex is a 63 y.o. male who presents to the emergency department following acute onset of palpitations and sensation of racing heartbeat which began just prior to arrival as he was working out in the heat.  Patient states he believes he drank a bottle of water too fast, attributes symptoms to this.  He reports fatigue but denies any associated chest pain or shortness of breath.  He reports lightheadedness but denies syncope.     Nursing Notes were all reviewed and agreed with or any disagreements were addressed in the HPI.     REVIEW OF SYSTEMS      Review of Systems     Positives and Pertinent negatives as per HPI.    PAST HISTORY     Past Medical History:  Past Medical History:   Diagnosis Date    Gout          Past Surgical History:  Past Surgical History:   Procedure Laterality Date    COLONOSCOPY      OTHER SURGICAL HISTORY  1990s    ORAL SURGERY       Family History:  Family History   Problem Relation Age of Onset    Diabetes Father     Anesth Problems Neg Hx     Diabetes Mother     Cancer Father         PROSTATE    Gout Mother     Hypertension Mother        Social History:  Social History     Tobacco Use    Smoking status: Former     Current packs/day: 0.00     Types: Cigarettes     Quit date: 10/14/1998     Years since quittin.5    Smokeless tobacco: Never   Substance Use Topics    Alcohol use: Yes     Alcohol/week: 31.0 standard drinks of alcohol    Drug use:

## 2024-05-03 NOTE — H&P
ventricular response with premature   ventricular or aberrantly conducted complexes  Incomplete right bundle branch block  Nonspecific ST abnormality  When compared with ECG of 14-OCT-2014 14:34,  Atrial fibrillation has replaced Sinus rhythm  Vent. rate has increased  BPM  Incomplete right bundle branch block is now present     CBC with Auto Differential    Collection Time: 05/03/24  2:13 PM   Result Value Ref Range    WBC 6.9 4.1 - 11.1 K/uL    RBC 5.40 4.10 - 5.70 M/uL    Hemoglobin 15.0 12.1 - 17.0 g/dL    Hematocrit 44.7 36.6 - 50.3 %    MCV 82.8 80.0 - 99.0 FL    MCH 27.8 26.0 - 34.0 PG    MCHC 33.6 30.0 - 36.5 g/dL    RDW 12.1 11.5 - 14.5 %    Platelets 304 150 - 400 K/uL    MPV 10.3 8.9 - 12.9 FL    Nucleated RBCs 0.0 0  WBC    nRBC 0.00 0.00 - 0.01 K/uL    Neutrophils % 63 32 - 75 %    Lymphocytes % 24 12 - 49 %    Monocytes % 11 5 - 13 %    Eosinophils % 1 0 - 7 %    Basophils % 1 0 - 1 %    Immature Granulocytes % 0 0.0 - 0.5 %    Neutrophils Absolute 4.4 1.8 - 8.0 K/UL    Lymphocytes Absolute 1.6 0.8 - 3.5 K/UL    Monocytes Absolute 0.8 0.0 - 1.0 K/UL    Eosinophils Absolute 0.1 0.0 - 0.4 K/UL    Basophils Absolute 0.0 0.0 - 0.1 K/UL    Immature Granulocytes Absolute 0.0 0.00 - 0.04 K/UL    Differential Type AUTOMATED     Comprehensive Metabolic Panel    Collection Time: 05/03/24  2:13 PM   Result Value Ref Range    Sodium 142 136 - 145 mmol/L    Potassium 3.6 3.5 - 5.1 mmol/L    Chloride 109 (H) 97 - 108 mmol/L    CO2 23 21 - 32 mmol/L    Anion Gap 10 5 - 15 mmol/L    Glucose 143 (H) 65 - 100 mg/dL    BUN 17 6 - 20 MG/DL    Creatinine 1.12 0.70 - 1.30 MG/DL    Bun/Cre Ratio 15 12 - 20      Est, Glom Filt Rate 75 >60 ml/min/1.73m2    Calcium 9.7 8.5 - 10.1 MG/DL    Total Bilirubin 0.8 0.2 - 1.0 MG/DL    ALT 51 12 - 78 U/L    AST 42 (H) 15 - 37 U/L    Alk Phosphatase 57 45 - 117 U/L    Total Protein 7.8 6.4 - 8.2 g/dL    Albumin 4.4 3.5 - 5.0 g/dL    Globulin 3.4 2.0 - 4.0 g/dL

## 2024-05-04 LAB
ANION GAP SERPL CALC-SCNC: 3 MMOL/L (ref 5–15)
BASOPHILS # BLD: 0 K/UL (ref 0–0.1)
BASOPHILS NFR BLD: 0 % (ref 0–1)
BUN SERPL-MCNC: 18 MG/DL (ref 6–20)
BUN/CREAT SERPL: 23 (ref 12–20)
CALCIUM SERPL-MCNC: 8.6 MG/DL (ref 8.5–10.1)
CHLORIDE SERPL-SCNC: 110 MMOL/L (ref 97–108)
CO2 SERPL-SCNC: 28 MMOL/L (ref 21–32)
CREAT SERPL-MCNC: 0.8 MG/DL (ref 0.7–1.3)
DIFFERENTIAL METHOD BLD: NORMAL
EOSINOPHIL # BLD: 0.1 K/UL (ref 0–0.4)
EOSINOPHIL NFR BLD: 2 % (ref 0–7)
ERYTHROCYTE [DISTWIDTH] IN BLOOD BY AUTOMATED COUNT: 12.2 % (ref 11.5–14.5)
GLUCOSE SERPL-MCNC: 80 MG/DL (ref 65–100)
HCT VFR BLD AUTO: 42.9 % (ref 36.6–50.3)
HGB BLD-MCNC: 13.8 G/DL (ref 12.1–17)
IMM GRANULOCYTES # BLD AUTO: 0 K/UL (ref 0–0.04)
IMM GRANULOCYTES NFR BLD AUTO: 0 % (ref 0–0.5)
LYMPHOCYTES # BLD: 2.8 K/UL (ref 0.8–3.5)
LYMPHOCYTES NFR BLD: 38 % (ref 12–49)
MCH RBC QN AUTO: 27.6 PG (ref 26–34)
MCHC RBC AUTO-ENTMCNC: 32.2 G/DL (ref 30–36.5)
MCV RBC AUTO: 85.8 FL (ref 80–99)
MONOCYTES # BLD: 0.8 K/UL (ref 0–1)
MONOCYTES NFR BLD: 11 % (ref 5–13)
NEUTS SEG # BLD: 3.6 K/UL (ref 1.8–8)
NEUTS SEG NFR BLD: 49 % (ref 32–75)
NRBC # BLD: 0 K/UL (ref 0–0.01)
NRBC BLD-RTO: 0 PER 100 WBC
NT PRO BNP: 581 PG/ML
PLATELET # BLD AUTO: 247 K/UL (ref 150–400)
PMV BLD AUTO: 10.1 FL (ref 8.9–12.9)
POTASSIUM SERPL-SCNC: 4 MMOL/L (ref 3.5–5.1)
RBC # BLD AUTO: 5 M/UL (ref 4.1–5.7)
SODIUM SERPL-SCNC: 141 MMOL/L (ref 136–145)
TROPONIN I SERPL HS-MCNC: 92 NG/L (ref 0–76)
UFH PPP CHRO-ACNC: 0.34 IU/ML
UFH PPP CHRO-ACNC: 0.35 IU/ML
WBC # BLD AUTO: 7.4 K/UL (ref 4.1–11.1)

## 2024-05-04 PROCEDURE — 2060000000 HC ICU INTERMEDIATE R&B

## 2024-05-04 PROCEDURE — 84439 ASSAY OF FREE THYROXINE: CPT

## 2024-05-04 PROCEDURE — 93005 ELECTROCARDIOGRAM TRACING: CPT | Performed by: INTERNAL MEDICINE

## 2024-05-04 PROCEDURE — 6360000002 HC RX W HCPCS: Performed by: STUDENT IN AN ORGANIZED HEALTH CARE EDUCATION/TRAINING PROGRAM

## 2024-05-04 PROCEDURE — 80048 BASIC METABOLIC PNL TOTAL CA: CPT

## 2024-05-04 PROCEDURE — 2580000003 HC RX 258: Performed by: INTERNAL MEDICINE

## 2024-05-04 PROCEDURE — 83880 ASSAY OF NATRIURETIC PEPTIDE: CPT

## 2024-05-04 PROCEDURE — 84484 ASSAY OF TROPONIN QUANT: CPT

## 2024-05-04 PROCEDURE — 36415 COLL VENOUS BLD VENIPUNCTURE: CPT

## 2024-05-04 PROCEDURE — 84443 ASSAY THYROID STIM HORMONE: CPT

## 2024-05-04 PROCEDURE — 6370000000 HC RX 637 (ALT 250 FOR IP): Performed by: INTERNAL MEDICINE

## 2024-05-04 PROCEDURE — 85025 COMPLETE CBC W/AUTO DIFF WBC: CPT

## 2024-05-04 PROCEDURE — 85520 HEPARIN ASSAY: CPT

## 2024-05-04 RX ADMIN — METOPROLOL TARTRATE 25 MG: 25 TABLET, FILM COATED ORAL at 20:10

## 2024-05-04 RX ADMIN — SODIUM CHLORIDE: 9 INJECTION, SOLUTION INTRAVENOUS at 17:23

## 2024-05-04 RX ADMIN — SODIUM CHLORIDE, PRESERVATIVE FREE 10 ML: 5 INJECTION INTRAVENOUS at 20:10

## 2024-05-04 RX ADMIN — Medication 100 MG: at 10:43

## 2024-05-04 RX ADMIN — SODIUM CHLORIDE, PRESERVATIVE FREE 10 ML: 5 INJECTION INTRAVENOUS at 10:43

## 2024-05-04 RX ADMIN — SODIUM CHLORIDE: 9 INJECTION, SOLUTION INTRAVENOUS at 03:14

## 2024-05-04 RX ADMIN — ALLOPURINOL 100 MG: 100 TABLET ORAL at 10:43

## 2024-05-04 RX ADMIN — COLCHICINE 0.6 MG: 0.6 TABLET, FILM COATED ORAL at 10:43

## 2024-05-04 RX ADMIN — METOPROLOL TARTRATE 25 MG: 25 TABLET, FILM COATED ORAL at 11:34

## 2024-05-04 RX ADMIN — HEPARIN SODIUM AND DEXTROSE 11 UNITS/KG/HR: 10000; 5 INJECTION INTRAVENOUS at 17:24

## 2024-05-04 NOTE — PLAN OF CARE
Problem: Discharge Planning  Goal: Discharge to home or other facility with appropriate resources  Outcome: Progressing  Flowsheets (Taken 5/4/2024 0745)  Discharge to home or other facility with appropriate resources:   Identify barriers to discharge with patient and caregiver   Arrange for needed discharge resources and transportation as appropriate   Identify discharge learning needs (meds, wound care, etc)     Problem: Safety - Adult  Goal: Free from fall injury  Outcome: Progressing  Flowsheets (Taken 5/4/2024 0745)  Free From Fall Injury: Instruct family/caregiver on patient safety

## 2024-05-04 NOTE — CONSULTS
[facility]  [address], [place] [id number] [phone]  [email or web address]       CARDIOLOGY CONSULTATION       Date of  Admission: 5/3/2024  2:08 PM     Admission type:Emergency   Primary Care Physician:Simi To MD     Attending Provider: Darcie Jeffery MD  Cardiology Provider:     PLEASE NOTE THAT WE CONFIRMED WITH THE REFERRING PHYSICIAN PRIOR TO SEEING THE PATIENT THAT THE PATIENT IS BEING REFERRED FOR INITIAL HOSPITAL EVALUATION AND FOR LONG-TERM ONGOING CARDIAC CARE    CC/REASON FOR CONSULT: Palpitations started yesterday after drinking cold water     Subjective:     Gomez Alex is a 63 y.o. male admitted for Atrial fibrillation with rapid ventricular response (HCC) [I48.91].Patient complains of  dyspnea, fatigue, and palpitations. Previous treatment/evaluation includes  none  . Cardiac risk factors: advanced age (older than 55 for men, 65 for women), diabetes mellitus, and smoking/ tobacco exposure.  63-year-old male with past medical history of diabetes type 2 not on any medication could not tolerate metformin last hemoglobin A1c was 6.5, ex-smoker, gout alcohol 3-5 beers a day, presented with complaints of palpitations that started around noontime after drinking cold water while at work.  This was associated with feeling of slight short of breath and fatigue.  Therefore he came to the ED in the ED he was found to be in A-fib with rapid ventricular response at 170 bpm.  He was hemodynamically stable.  Denied any other symptoms.  He was started on Cardizem drip for rate control and stopped overnight and IV heparin.  He does give history of some palpitations during the COVID after that brief episodes of palpitations.  Few times in the last couple of years  Currently he is in A-fib heart rates  asymptomatic.  Mildly elevated proBNP at 581.  No significant elevation of high-sensitivity troponin.    Patient Active Problem List    Diagnosis Date

## 2024-05-05 LAB
ERYTHROCYTE [DISTWIDTH] IN BLOOD BY AUTOMATED COUNT: 12.2 % (ref 11.5–14.5)
EST. AVERAGE GLUCOSE BLD GHB EST-MCNC: 126 MG/DL
HBA1C MFR BLD: 6 % (ref 4–5.6)
HCT VFR BLD AUTO: 45.5 % (ref 36.6–50.3)
HGB BLD-MCNC: 14.7 G/DL (ref 12.1–17)
MCH RBC QN AUTO: 28.1 PG (ref 26–34)
MCHC RBC AUTO-ENTMCNC: 32.3 G/DL (ref 30–36.5)
MCV RBC AUTO: 87 FL (ref 80–99)
NRBC # BLD: 0 K/UL (ref 0–0.01)
NRBC BLD-RTO: 0 PER 100 WBC
PLATELET # BLD AUTO: 264 K/UL (ref 150–400)
PMV BLD AUTO: 10.7 FL (ref 8.9–12.9)
RBC # BLD AUTO: 5.23 M/UL (ref 4.1–5.7)
T4 FREE SERPL-MCNC: 1.1 NG/DL (ref 0.8–1.5)
TSH SERPL DL<=0.05 MIU/L-ACNC: 2.3 UIU/ML (ref 0.36–3.74)
UFH PPP CHRO-ACNC: 0.32 IU/ML
WBC # BLD AUTO: 5.8 K/UL (ref 4.1–11.1)

## 2024-05-05 PROCEDURE — 83036 HEMOGLOBIN GLYCOSYLATED A1C: CPT

## 2024-05-05 PROCEDURE — 6370000000 HC RX 637 (ALT 250 FOR IP): Performed by: INTERNAL MEDICINE

## 2024-05-05 PROCEDURE — 6360000002 HC RX W HCPCS: Performed by: STUDENT IN AN ORGANIZED HEALTH CARE EDUCATION/TRAINING PROGRAM

## 2024-05-05 PROCEDURE — 2060000000 HC ICU INTERMEDIATE R&B

## 2024-05-05 PROCEDURE — 36415 COLL VENOUS BLD VENIPUNCTURE: CPT

## 2024-05-05 PROCEDURE — 2580000003 HC RX 258: Performed by: INTERNAL MEDICINE

## 2024-05-05 PROCEDURE — 85520 HEPARIN ASSAY: CPT

## 2024-05-05 PROCEDURE — 85027 COMPLETE CBC AUTOMATED: CPT

## 2024-05-05 RX ORDER — METOPROLOL TARTRATE 50 MG/1
50 TABLET, FILM COATED ORAL 2 TIMES DAILY
Status: DISCONTINUED | OUTPATIENT
Start: 2024-05-05 | End: 2024-05-05

## 2024-05-05 RX ADMIN — SODIUM CHLORIDE: 9 INJECTION, SOLUTION INTRAVENOUS at 06:21

## 2024-05-05 RX ADMIN — Medication 100 MG: at 07:59

## 2024-05-05 RX ADMIN — METOPROLOL TARTRATE 25 MG: 25 TABLET, FILM COATED ORAL at 07:59

## 2024-05-05 RX ADMIN — METOPROLOL TARTRATE 37.5 MG: 25 TABLET, FILM COATED ORAL at 21:18

## 2024-05-05 RX ADMIN — COLCHICINE 0.6 MG: 0.6 TABLET, FILM COATED ORAL at 07:59

## 2024-05-05 RX ADMIN — ALLOPURINOL 100 MG: 100 TABLET ORAL at 07:59

## 2024-05-05 RX ADMIN — SODIUM CHLORIDE, PRESERVATIVE FREE 10 ML: 5 INJECTION INTRAVENOUS at 21:20

## 2024-05-05 RX ADMIN — HEPARIN SODIUM AND DEXTROSE 11 UNITS/KG/HR: 10000; 5 INJECTION INTRAVENOUS at 18:58

## 2024-05-05 RX ADMIN — METOPROLOL TARTRATE 12.5 MG: 25 TABLET, FILM COATED ORAL at 08:53

## 2024-05-05 ASSESSMENT — PAIN SCALES - GENERAL
PAINLEVEL_OUTOF10: 0

## 2024-05-05 NOTE — CARE COORDINATION
Care Management Initial Assessment       RUR: 4%  Readmission? No    CM made room visit with patient to complete initial assessment. No family present at bedside, however, wife on speaker phone listening. Pt, wife, and pt's mother live in a 1 story home with 3 orin. Pt is independent with ADLs and driving. Pt has no DME. Plan is for patient to return home. Patient indicated he plans on driving himself home. CM encouraged patient to discuss this with MD to ensure it is allowed, and if not, to have a back up transport plan. Pt reported his wife or another family member would be able to transport him home if needed.        05/05/24 1204   Service Assessment   Patient Orientation Alert and Oriented   Cognition Alert   History Provided By Patient   Primary Caregiver Self   Support Systems Spouse/Significant Other   Patient's Healthcare Decision Maker is:   (patient confirmed he has an ACP that lists his wife at A, requested copy be provided)   PCP Verified by CM Yes   Last Visit to PCP Within last 3 months   Prior Functional Level Independent in ADLs/IADLs   Current Functional Level Independent in ADLs/IADLs   Can patient return to prior living arrangement Yes   Family able to assist with home care needs: Yes   Social/Functional History   Lives With Spouse;Parent  (patient, wife and pt's mother)   Type of Home House   Home Layout One level   Home Access   (3 orin)   Home Equipment None   Active  Yes   Discharge Planning   Patient expects to be discharged to: House   Condition of Participation: Discharge Planning   The Plan for Transition of Care is related to the following treatment goals: home       CHARO Sawyer, CM  [id number]

## 2024-05-05 NOTE — PLAN OF CARE
Problem: Discharge Planning  Goal: Discharge to home or other facility with appropriate resources  Outcome: Progressing  Flowsheets (Taken 5/5/2024 0720)  Discharge to home or other facility with appropriate resources: Identify barriers to discharge with patient and caregiver     Problem: Safety - Adult  Goal: Free from fall injury  Outcome: Progressing

## 2024-05-06 VITALS
RESPIRATION RATE: 18 BRPM | WEIGHT: 198 LBS | DIASTOLIC BLOOD PRESSURE: 71 MMHG | HEART RATE: 53 BPM | SYSTOLIC BLOOD PRESSURE: 105 MMHG | TEMPERATURE: 97.9 F | BODY MASS INDEX: 26.24 KG/M2 | HEIGHT: 73 IN | OXYGEN SATURATION: 99 %

## 2024-05-06 LAB
EKG DIAGNOSIS: NORMAL
EKG Q-T INTERVAL: 342 MS
EKG QRS DURATION: 106 MS
EKG QTC CALCULATION (BAZETT): 475 MS
EKG R AXIS: -26 DEGREES
EKG T AXIS: 32 DEGREES
EKG VENTRICULAR RATE: 116 BPM
UFH PPP CHRO-ACNC: 0.65 IU/ML

## 2024-05-06 PROCEDURE — 85520 HEPARIN ASSAY: CPT

## 2024-05-06 PROCEDURE — 6370000000 HC RX 637 (ALT 250 FOR IP): Performed by: STUDENT IN AN ORGANIZED HEALTH CARE EDUCATION/TRAINING PROGRAM

## 2024-05-06 PROCEDURE — 2580000003 HC RX 258: Performed by: INTERNAL MEDICINE

## 2024-05-06 PROCEDURE — 6370000000 HC RX 637 (ALT 250 FOR IP): Performed by: INTERNAL MEDICINE

## 2024-05-06 PROCEDURE — 36415 COLL VENOUS BLD VENIPUNCTURE: CPT

## 2024-05-06 RX ORDER — LABETALOL 100 MG/1
50 TABLET, FILM COATED ORAL EVERY 12 HOURS SCHEDULED
Status: DISCONTINUED | OUTPATIENT
Start: 2024-05-06 | End: 2024-05-06

## 2024-05-06 RX ORDER — METOPROLOL SUCCINATE 25 MG/1
12.5 TABLET, EXTENDED RELEASE ORAL DAILY
Qty: 30 TABLET | Refills: 3 | Status: SHIPPED | OUTPATIENT
Start: 2024-05-06

## 2024-05-06 RX ORDER — METOPROLOL SUCCINATE 25 MG/1
12.5 TABLET, EXTENDED RELEASE ORAL DAILY
Status: DISCONTINUED | OUTPATIENT
Start: 2024-05-06 | End: 2024-05-06 | Stop reason: HOSPADM

## 2024-05-06 RX ORDER — FOLIC ACID 1 MG/1
1 TABLET ORAL DAILY
Qty: 30 TABLET | Refills: 0 | Status: SHIPPED | OUTPATIENT
Start: 2024-05-06 | End: 2024-06-05

## 2024-05-06 RX ORDER — THIAMINE MONONITRATE (VIT B1) 100 MG
100 TABLET ORAL DAILY
Qty: 30 TABLET | Refills: 0 | Status: SHIPPED | OUTPATIENT
Start: 2024-05-07 | End: 2024-06-06

## 2024-05-06 RX ORDER — ATORVASTATIN CALCIUM 20 MG/1
20 TABLET, FILM COATED ORAL NIGHTLY
Status: DISCONTINUED | OUTPATIENT
Start: 2024-05-06 | End: 2024-05-06 | Stop reason: HOSPADM

## 2024-05-06 RX ORDER — ATORVASTATIN CALCIUM 20 MG/1
20 TABLET, FILM COATED ORAL NIGHTLY
Qty: 30 TABLET | Refills: 3 | Status: SHIPPED | OUTPATIENT
Start: 2024-05-06

## 2024-05-06 RX ADMIN — Medication 100 MG: at 09:29

## 2024-05-06 RX ADMIN — APIXABAN 5 MG: 5 TABLET, FILM COATED ORAL at 09:42

## 2024-05-06 RX ADMIN — COLCHICINE 0.6 MG: 0.6 TABLET, FILM COATED ORAL at 09:29

## 2024-05-06 RX ADMIN — METOPROLOL TARTRATE 25 MG: 25 TABLET, FILM COATED ORAL at 09:29

## 2024-05-06 RX ADMIN — ALLOPURINOL 100 MG: 100 TABLET ORAL at 09:29

## 2024-05-06 RX ADMIN — SODIUM CHLORIDE, PRESERVATIVE FREE 10 ML: 5 INJECTION INTRAVENOUS at 09:30

## 2024-05-06 ASSESSMENT — PAIN SCALES - GENERAL
PAINLEVEL_OUTOF10: 0
PAINLEVEL_OUTOF10: 0

## 2024-05-06 NOTE — PROGRESS NOTES
[facility]  [address], [place] [id number] [phone]  [email or web address]  CARDIOLOGY PROGRESS NOTE    5/5/2024 4:52 PM    Admit Date: 5/3/2024    Admit Diagnosis:   Atrial fibrillation with rapid ventricular response (HCC) [I48.91]    Subjective:     Gomez Alex patient converted to sinus rhythm at 60 bpm echo pending feels much better.    /66   Pulse 60   Temp 97.9 °F (36.6 °C) (Oral)   Resp 17   Ht 1.854 m (6' 1\")   Wt 89.8 kg (198 lb)   SpO2 97%   BMI 26.12 kg/m²     Current Facility-Administered Medications   Medication Dose Route Frequency    metoprolol tartrate (LOPRESSOR) tablet 37.5 mg  37.5 mg Oral BID    dilTIAZem 100 mg in sodium chloride 0.9 % 100 mL infusion (ADD-Stafford)  2.5-15 mg/hr IntraVENous Continuous    oxyCODONE (ROXICODONE) immediate release tablet 5 mg  5 mg Oral Q4H PRN    allopurinol (ZYLOPRIM) tablet 100 mg  100 mg Oral Daily    colchicine (COLCRYS) tablet 0.6 mg  0.6 mg Oral Daily    ondansetron (ZOFRAN-ODT) disintegrating tablet 4 mg  4 mg Oral Q8H PRN    Or    ondansetron (ZOFRAN) injection 4 mg  4 mg IntraVENous Q6H PRN    polyethylene glycol (GLYCOLAX) packet 17 g  17 g Oral Daily PRN    acetaminophen (TYLENOL) tablet 650 mg  650 mg Oral Q6H PRN    Or    acetaminophen (TYLENOL) suppository 650 mg  650 mg Rectal Q6H PRN    heparin (porcine) injection 4,000 Units  4,000 Units IntraVENous PRN    heparin (porcine) injection 2,000 Units  2,000 Units IntraVENous PRN    heparin 25,000 units in dextrose 5% 250 mL (premix) infusion  5-30 Units/kg/hr IntraVENous Continuous    sodium chloride flush 0.9 % injection 5-40 mL  5-40 mL IntraVENous 2 times per day    sodium chloride flush 0.9 % injection 5-40 mL  5-40 mL IntraVENous PRN    0.9 % sodium chloride infusion   IntraVENous PRN    thiamine mononitrate tablet 100 mg  100 mg Oral Daily    LORazepam (ATIVAN) tablet 1 mg  1 mg Oral Q1H PRN    Or    LORazepam (ATIVAN) 
        [facility]  [address] [place] [address] [phone]  [email or web address]  CARDIOLOGY PROGRESS NOTE    5/6/2024 3:08 PM    Admit Date: 5/3/2024    Admit Diagnosis:   Atrial fibrillation with rapid ventricular response (HCC) [I48.91]    Subjective:     Gomez Alex was seen and examined at the bedside.  Ambulating without difficulty.  Breathing is okay.  Denies any other complaints.  Admits to drinking alcohol 3-5 beers per week.  Admits to snoring.    /64   Pulse 57   Temp 98 °F (36.7 °C) (Oral)   Resp 18   Ht 1.854 m (6' 1\")   Wt 89.8 kg (198 lb)   SpO2 98%   BMI 26.12 kg/m²     Current Facility-Administered Medications   Medication Dose Route Frequency    apixaban (ELIQUIS) tablet 5 mg  5 mg Oral BID    atorvastatin (LIPITOR) tablet 20 mg  20 mg Oral Nightly    labetalol (NORMODYNE) tablet 50 mg  50 mg Oral 2 times per day    oxyCODONE (ROXICODONE) immediate release tablet 5 mg  5 mg Oral Q4H PRN    allopurinol (ZYLOPRIM) tablet 100 mg  100 mg Oral Daily    colchicine (COLCRYS) tablet 0.6 mg  0.6 mg Oral Daily    ondansetron (ZOFRAN-ODT) disintegrating tablet 4 mg  4 mg Oral Q8H PRN    Or    ondansetron (ZOFRAN) injection 4 mg  4 mg IntraVENous Q6H PRN    polyethylene glycol (GLYCOLAX) packet 17 g  17 g Oral Daily PRN    acetaminophen (TYLENOL) tablet 650 mg  650 mg Oral Q6H PRN    Or    acetaminophen (TYLENOL) suppository 650 mg  650 mg Rectal Q6H PRN    sodium chloride flush 0.9 % injection 5-40 mL  5-40 mL IntraVENous 2 times per day    sodium chloride flush 0.9 % injection 5-40 mL  5-40 mL IntraVENous PRN    0.9 % sodium chloride infusion   IntraVENous PRN    thiamine mononitrate tablet 100 mg  100 mg Oral Daily    LORazepam (ATIVAN) tablet 1 mg  1 mg Oral Q1H PRN    Or    LORazepam (ATIVAN) injection 1 mg  1 mg IntraVENous Q1H PRN    Or    LORazepam (ATIVAN) tablet 2 mg  2 mg Oral Q1H PRN    Or    LORazepam (ATIVAN) injection 2 mg  2 mg 
      Hospitalist Progress Note    NAME:   Gomez Alex   : 1960   MRN: [id number]     Date/Time: 2024 2:06 PM  Patient PCP: Simi To MD    Estimated discharge date:   Barriers:   2d echo       Assessment / Plan:  New onset A-fib with RVR  Alcohol abuse  Gout  status post Dilt push and started on dilt  drip, weaning off her diltiazem drip per cardiology and started on p.o. metoprolol  Will check 2D echo, BNP mildly elevated   chest x-ray clear  Patient started on heparin drip by cardiology  Patient reporting drinking 2-5 beers  a day, last drink was yesterday  Will monitor for alcohol withdrawal,  Continue with allopurinol  Cardiology consult, discussed with cardiology  Started on metoprolol, increased dose as heart rate does feel in control  : Heart rate between 50 and 60s, metoprolol reduced to 25 mg twice daily, reviewed cardiology note, stop heparin drip and start Eliquis.  Still waiting for the 2D echo     Prediabetes  HbA1c is 6   LDL is 102, start Lipitor      Medical Decision Making:   I personally reviewed labs: CBC BMP  I personally reviewed imaging:   I personally reviewed EKG: Reviewed to monitor  Toxic drug monitoring: CBC while on Eliquis  Discussed case with: cardiology         Code Status: Full code  DVT Prophylaxis: Eliquis    Subjective:     Chief Complaint / Reason for Physician Visit  Follow-up A-fib  Heart rate control, no acute complaints    Objective:     VITALS:   Last 24hrs VS reviewed since prior progress note. Most recent are:  Patient Vitals for the past 24 hrs:   BP Temp Temp src Pulse Resp SpO2   24 1140 103/64 98 °F (36.7 °C) Oral 57 -- 98 %   24 0930 -- -- -- 68 -- --   24 0735 117/82 98 °F (36.7 °C) Oral 53 18 99 %   24 0305 121/86 97.9 °F (36.6 °C) Oral 51 17 99 %   24 2306 109/74 97.8 °F (36.6 °C) Oral 60 16 99 %   24 2118 122/80 -- -- 60 -- --   24 1940 118/86 98.2 °F (36.8 °C) Oral 81 15 99 %   24 1532 102/66 
      Hospitalist Progress Note    NAME:   Gomez Alex   : 1960   MRN: [id number]     Date/Time: 2024 12:12 PM  Patient PCP: Simi To MD    Estimated discharge date:   Barriers:  HR control , cards  clearance ,       Assessment / Plan:  New onset A-fib with RVR  Alcohol abuse  Gout  status post Dilt push and started on dilt  drip, weaning off her diltiazem drip per cardiology and started on p.o. metoprolol  Will check 2D echo, BNP mildly elevated   chest x-ray clear  Patient started on heparin drip by cardiology  Patient reporting drinking 2-5 beers  a day, last drink was yesterday  Will monitor for alcohol withdrawal,  Continue with allopurinol  Cardiology consult, discussed with cardiology  Started on metoprolol, increased dose as heart rate does feel in control     Prediabetes  Check HbA1c and lipid profile        Medical Decision Making:   I personally reviewed labs: CBC BMP  I personally reviewed imaging: Chest x-ray  I personally reviewed EKG: Reviewed to monitor  Toxic drug monitoring: PTT while on heparin drip  Discussed case with: cardiology         Code Status: Full code  DVT Prophylaxis: Heparin drip  GI Prophylaxis:    Subjective:     Chief Complaint / Reason for Physician Visit  HR uncontrolled     Objective:     VITALS:   Last 24hrs VS reviewed since prior progress note. Most recent are:  Patient Vitals for the past 24 hrs:   BP Temp Temp src Pulse Resp SpO2   24 1100 -- -- -- 64 -- --   24 1040 -- -- -- 57 -- --   24 1015 -- -- -- 60 -- --   24 1010 -- -- -- 54 -- --   24 1000 -- -- -- 95 -- --   24 0930 -- -- -- (!) 106 -- --   24 0853 112/60 -- -- (!) 119 -- --   24 0759 119/78 -- -- (!) 138 -- --   24 0720 105/80 97.6 °F (36.4 °C) Oral (!) 123 16 --   24 0300 120/82 98.4 °F (36.9 °C) Tympanic (!) 126  --   24 232 (!) 127/90 98.5 °F (36.9 °C) Oral 88  --   24 1943 120/72 97.8 °F (36.6 °C) Oral (!) 101 16 
0700: Bedside and Verbal shift change report given to Kika Mendez RN (oncoming nurse) by BRIAN Stout (offgoing nurse). Report included the following information Nurse Handoff Report, Index, Intake/Output, MAR, Recent Results, and Cardiac Rhythm NSR  .     1530: DISCHARGE SUMMARY FROM [facility] NURSE    The patient is stable for discharge. I have reviewed the discharge instructions with the patient. The patient verbalized understanding. All questions were fully answered. The patient verbalized no complaints.    Hard scripts and medication handouts were given and reviewed with the patient. Appropriate educational materials and medication side effects teaching were also provided.    Cardiac monitor and IV line(s) were removed.     There were no personal belongings, valuables or home medications left at patient's bedside,  or safe.     Kika Mendez RN, 5/6/2024 3:28 PM    
0720: Bedside and Verbal shift change report given to BRIAN Sanchez/ BRIAN Otoole (oncoming nurse) by BRIAN Rosado (offgoing nurse). Report included the following information Nurse Handoff Report, Index, Adult Overview, Intake/Output, MAR, and Recent Results.     0853: Metoprolol 12.5 mg one time dose given per MD.    1145: Pt converted into NSR.    End of Shift Note    Bedside shift change report given to RN (oncoming nurse) by Laura Avilez RN (offgoing nurse).  Report included the following information SBAR, Intake/Output, Accordion, Recent Results, and Cardiac Rhythm A. Fib    Shift worked:  6912-1908     Shift summary and any significant changes:     See above note     Concerns for physician to address:  N/A     Zone phone for oncoming shift:          Activity:     Number times ambulated in hallways past shift: 0  Number of times OOB to chair past shift: 4    Cardiac:   Cardiac Monitoring: Yes           Access:  Current line(s): PIV     Genitourinary:   Urinary status: voiding    Respiratory:      Chronic home O2 use?: NO  Incentive spirometer at bedside: NO       GI:     Current diet:  ADULT DIET; Regular  Passing flatus: YES  Tolerating current diet: YES       Pain Management:   Patient states pain is manageable on current regimen: YES    Skin:     Interventions: increase time out of bed, PT/OT consult, and nutritional support    Patient Safety:  Fall Score:    Interventions: gripper socks, pt to call before getting OOB, and stay with me (per policy)       Length of Stay:  Expected LOS: 3  Actual LOS: 2      Laura Avilez RN                            
I have reviewed and am in agreement with the assessment and documentation as performed by my orientee, BRIAN Sanchez. Please refer to Laura's progress note & flowsheet documentation for update on pt's daily events.    
I have reviewed and am in agreement with the assessment and documentation as performed by my orientee, BRIAN Sanchez. Please refer to Laura's progress note & flowsheet documentation for update on pt's daily events.      0814: MD Jose D notified of pts elevated HR (120s-140s at rest). Scheduled Metoprolol has been given. Pt has no complaints of palpitations or chest discomfort.     0820: On call cardiologist from [facility] paged regarding pts elevated HR.   
PCP hospital follow-up transitional care appointment has been scheduled with Dr. To on 5/15/24 at 1115. Pending patient discharge. Lolly Rodríguez, Care Management Assistant   
Verified heparin gtt is therapeutic with Pharmacy with Anti Xa at 0.35  Second therapeutic level at this time.  
YES  Reviewed most current radiology test results   YES  Review and summation of old records today    NO  Reviewed patient's current orders and MAR    YES  PMH/SH reviewed - no change compared to H&P    Procedures: see electronic medical records for all procedures/Xrays and details which were not copied into this note but were reviewed prior to creation of Plan.      LABS:  I reviewed today's most current labs and imaging studies.  Pertinent labs include:  Recent Labs     05/03/24  1413 05/04/24  0106   WBC 6.9 7.4   HGB 15.0 13.8   HCT 44.7 42.9    247     Recent Labs     05/03/24  1413 05/03/24  1848 05/04/24  0106     --  141   K 3.6  --  4.0   *  --  110*   CO2 23  --  28   GLUCOSE 143*  --  80   BUN 17  --  18   CREATININE 1.12  --  0.80   CALCIUM 9.7  --  8.6   MG 2.1  --   --    BILITOT 0.8  --   --    AST 42*  --   --    ALT 51  --   --    INR  --  1.0  --        Signed: Darcie Jeffery MD

## 2024-05-06 NOTE — DISCHARGE SUMMARY
Discharge Summary    Name: Gomez Alex  [id number]  YOB: 1960 (Age: 63 y.o.)   Date of Admission: 5/3/2024  Date of Discharge: 5/6/2024  Attending Physician: Darcie Jeffery MD    Discharge Diagnosis:   New onset A-fib with RVR  Alcohol abuse  Gout   Prediabetes  Consultations:  IP CONSULT TO CARDIOLOGY  IP CONSULT TO SOCIAL WORK      Brief Admission History/Reason for Admission Per Darcie Jeffery MD:   CHIEF COMPLAINT: Palpitation     HISTORY OF PRESENT ILLNESS:     Gomez Alex is a 63 y.o.  male with PMHx significant for for got , alcohol abuse who presented to the ED with complaint of sudden onset of palpitation, and fluttering in his heart.  Patient denies any chest pain or shortness of breath  In the ED, he was found to have heart rate in the 170s, EKG showed A-fib with RVR  His lab work is unremarkable     We were asked to admit for work up and evaluation of the above problems.             Brief Hospital Course by Main Problems:   New onset A-fib with RVR  Alcohol abuse  Gout  status post Dilt push and dilt  drip, weaning off her diltiazem drip per cardiology and started on p.o. metoprolol  Will check 2D echo, BNP mildly elevated   chest x-ray clear  Patient started on heparin drip by cardiology  Patient reporting drinking 2-5 beers  a day, last drink was yesterday  Will monitor for alcohol withdrawal,  Continue with allopurinol  Cardiology consult, discussed with cardiology  Started on metoprolol, increased dose as heart rate does feel in control  05/06: Heart rate between 50 and 60s, metoprolol reduced to 25 mg twice daily, reviewed cardiology note, stop heparin drip and start Eliquis.  2 D echo can be done as outpatient per cards . He is cleared to be discharged      Prediabetes  HbA1c is 6   LDL is 102, start Lipitor    Discharge Exam:  Patient seen and examined by me on discharge day.  Pertinent Findings:  Patient Vitals for the past 24 hrs:   BP Temp Temp

## 2024-05-06 NOTE — CARE COORDINATION
Patient is clear from a CM standpoint.    Transition of Care Plan: Home with follow ups.    RUR: 4% (low RUR)  Prior Level of Functioning: Independent  Disposition: Home with follow ups.  Cecile JIANG and [employer] contact information listed on AVS.  If SNF or IPR: Date FOC offered: N/A  Date FOC received: N/A  Accepting facility: N/A  Date authorization started with reference number: N/A  Date authorization received and expires: N/A  Follow up appointments: PCP/specialists if needed.  DME needed: None.  Transportation at discharge: Family  IM/IMM Medicare/ letter given: N/A BCBS  Is patient a  and connected with [facility]? No.   If yes, was  transfer form completed and [facility] notified? N/A  Caregiver Contact: Serenity Alex - spouse - [phone]   Discharge Caregiver contacted prior to discharge? Patient to contact.  Care Conference needed? No.  Barriers to discharge: Echo.    1050 - RN notified that patient is clear from a CM standpoint.    1335 - Eliquis $10 copay card provided to patient at bedside.    1550 - CM contacted pharmacy to perform eliquis price check but the pharmacy was still processing the prescription; could not provide price check at this time.     05/06/24 1427   Services At/After Discharge   Transition of Care Consult (CM Consult) N/A   Services At/After Discharge None   Mode of Transport at Discharge Other (see comment)  (Family)   Confirm Follow Up Transport Family   Condition of Participation: Discharge Planning   The Plan for Transition of Care is related to the following treatment goals: Home   The Patient and/or Patient Representative was provided with a Choice of Provider? Patient   The Patient and/Or Patient Representative agree with the Discharge Plan? Yes         JARETH Nelson, RN    Care Management  [phone]

## 2024-05-06 NOTE — PLAN OF CARE
Problem: Safety - Adult  Goal: Free from fall injury  5/5/2024 2230 by Argelia Paez RN  Outcome: Progressing  Note: Bed is in the lowest position and wheels are locked, call bell is within reach, bathroom light is on during evening hours, gripper socks are on and patient has been instructed to call out for assistance if needed.     As of now, patient is free from falls and will continue to be monitored.

## 2024-05-06 NOTE — DISCHARGE INSTRUCTIONS
DISCHARGE DIAGNOSIS:  New onset A-fib with RVR  Alcohol abuse  Gout   Prediabetes      MEDICATIONS:  It is important that you take the medication exactly as they are prescribed.   Keep your medication in the bottles provided by the pharmacist and keep a list of the medication names, dosages, and times to be taken in your wallet.   Do not take other medications without consulting your doctor.     Pain Management: per above medications    What to do at Home    Recommended diet:  cardiac diet    Recommended activity: activity as tolerated    If you have questions regarding the hospital related prescriptions or hospital related issues please call [facility] at . You can always direct your questions to your primary care doctor if you are unable to reach your hospital physician; your PCP works as an extension of your hospital doctor just like your hospital doctor is an extension of your PCP for your time at the hospital ([facility]).    If you experience any of the following symptoms then please call your primary care physician or return to the emergency room if you cannot get hold of your doctor:  Fever, chills, nausea, vomiting, diarrhea, change in mentation, falling, bleeding, shortness of breath,

## 2024-05-06 NOTE — PLAN OF CARE
Problem: Discharge Planning  Goal: Discharge to home or other facility with appropriate resources  Outcome: Progressing     Problem: Safety - Adult  Goal: Free from fall injury  5/6/2024 1156 by Kika Mendez RN  Outcome: Progressing  5/5/2024 2230 by Argelia Paez, RN  Outcome: Progressing  Note: Bed is in the lowest position and wheels are locked, call bell is within reach, bathroom light is on during evening hours, gripper socks are on and patient has been instructed to call out for assistance if needed.     As of now, patient is free from falls and will continue to be monitored.

## 2024-05-07 LAB
EKG ATRIAL RATE: 155 BPM
EKG DIAGNOSIS: NORMAL
EKG Q-T INTERVAL: 286 MS
EKG QRS DURATION: 96 MS
EKG QTC CALCULATION (BAZETT): 480 MS
EKG R AXIS: -22 DEGREES
EKG T AXIS: 42 DEGREES
EKG VENTRICULAR RATE: 170 BPM

## 2024-05-28 ENCOUNTER — OFFICE VISIT (OUTPATIENT)
Dept: PRIMARY CARE CLINIC | Facility: CLINIC | Age: 64
End: 2024-05-28
Payer: COMMERCIAL

## 2024-05-28 VITALS
HEIGHT: 73 IN | OXYGEN SATURATION: 98 % | HEART RATE: 68 BPM | WEIGHT: 196.2 LBS | RESPIRATION RATE: 17 BRPM | DIASTOLIC BLOOD PRESSURE: 78 MMHG | BODY MASS INDEX: 26 KG/M2 | TEMPERATURE: 97.8 F | SYSTOLIC BLOOD PRESSURE: 128 MMHG

## 2024-05-28 DIAGNOSIS — Z23 NEED FOR TETANUS BOOSTER: ICD-10-CM

## 2024-05-28 DIAGNOSIS — I48.0 PAROXYSMAL ATRIAL FIBRILLATION (HCC): Primary | ICD-10-CM

## 2024-05-28 DIAGNOSIS — E11.9 DIET-CONTROLLED DIABETES MELLITUS (HCC): ICD-10-CM

## 2024-05-28 PROCEDURE — 99214 OFFICE O/P EST MOD 30 MIN: CPT | Performed by: INTERNAL MEDICINE

## 2024-05-28 PROCEDURE — 3044F HG A1C LEVEL LT 7.0%: CPT | Performed by: INTERNAL MEDICINE

## 2024-05-28 RX ORDER — OMEGA-3S/DHA/EPA/FISH OIL/D3 300MG-1000
400 CAPSULE ORAL
COMMUNITY

## 2024-05-28 RX ORDER — ASCORBIC ACID 500 MG
500 TABLET ORAL DAILY
COMMUNITY

## 2024-05-28 RX ORDER — PSYLLIUM HUSK 0.4 G
CAPSULE ORAL
COMMUNITY

## 2024-05-28 ASSESSMENT — ENCOUNTER SYMPTOMS
ABDOMINAL PAIN: 0
SHORTNESS OF BREATH: 0
CONSTIPATION: 0
EYE DISCHARGE: 0
DIARRHEA: 0
COUGH: 0
CHEST TIGHTNESS: 0
COLOR CHANGE: 0
BACK PAIN: 0
SORE THROAT: 0
RHINORRHEA: 0

## 2024-05-28 ASSESSMENT — PATIENT HEALTH QUESTIONNAIRE - PHQ9
SUM OF ALL RESPONSES TO PHQ9 QUESTIONS 1 & 2: 0
2. FEELING DOWN, DEPRESSED OR HOPELESS: NOT AT ALL
SUM OF ALL RESPONSES TO PHQ QUESTIONS 1-9: 0
1. LITTLE INTEREST OR PLEASURE IN DOING THINGS: NOT AT ALL
SUM OF ALL RESPONSES TO PHQ QUESTIONS 1-9: 0

## 2024-05-28 NOTE — PROGRESS NOTES
Health Decision Maker has been checked with the patient      Patient has stated that the scribe can come in room    Chief Complaint   Patient presents with    Follow-up    Medication Refill    Discuss Medications       \"Have you been to the ER, urgent care clinic since your last visit?  Hospitalized since your last visit?\"    YES - When: approximately 3  weeks ago.  Where and Why: ED for Afib.    “Have you seen or consulted any other health care providers outside of [facility] since your last visit?”    NO      Vitals:    05/28/24 1528   BP: 128/78   Site: Left Upper Arm   Pulse: 68   Resp: 17   Temp: 97.8 °F (36.6 °C)   SpO2: 98%   Weight: 89 kg (196 lb 3.2 oz)   Height: 1.854 m (6' 1\")      Depression: Not at risk (5/28/2024)    PHQ-2     PHQ-2 Score: 0              Click Here for Release of Records Request    Specialist patient sees: no one currently    Chart reviewed: immunizations are documented.   Immunization History   Administered Date(s) Administered    COVID-19, PFIZER PURPLE top, DILUTE for use, (age 12 y+), 30mcg/0.3mL 03/30/2021, 04/20/2021, 12/22/2021    TDaP, ADACEL (age 10y-64y), BOOSTRIX (age 10y+), IM, 0.5mL 06/13/2007, 02/05/2009, 06/29/2012

## 2024-05-28 NOTE — PROGRESS NOTES
Gomez Alex (:  1960) is a 63 y.o. male, Established patient, here for evaluation of the following chief complaint(s):  Follow-up, Medication Refill, and Discuss Medications        ASSESSMENT/PLAN:  1. Paroxysmal atrial fibrillation (HCC)  Pt should follow up with cardiology for echocardiogram results. The results need to be shared with  me.  Continue Eliquis and Metoprolol.   CBC< CMP, TSH and Troponin reviewed. CXR in the ER reviewed.     2. Diet-controlled diabetes mellitus (HCC)  Pt should continue to monitor diet and continue to do cardio exercises. Repeat labs in .    3. Need for tetanus booster  -     Tetanus-Diphth-Acell Pertussis (BOOSTRIX) 5-2.5-18.5 LF-MCG/0.5 injection; Inject 0.5 mLs into the muscle once for 1 dose, Disp-0.5 mL, R-0Normal sent to pharmacy.  I sent tetanus shot to the pharmacy.         Subjective   SUBJECTIVE/OBJECTIVE:  HPI  .    Patient presents today for a hospital follow up for A-fib      Pt explains he was overheated and then drank a 12 oz bottle of cold water and he states everything went haywire. Patient admits to drinking beer on the weekend but at the time of the episode and going to the ED he was not drinking.  /78 today in office.    Pt was prescribed Eliquis 5 mg BID after A-fib diagnoses. Pt had an echo done last Thursday. Pt is compliant in taking metoprolol tartrate 25 mg BID  Pt denies any attack of gout in year but is compliant in taking colchicine prn.    Pt was put on thiamine 100 mg daily he is going to discontinue.    Pt admits he watches his diet most of th time. Pt admit he does some cardio at times but denies doing any weight training.        Patient Active Problem List   Diagnosis    Cervical spondylosis without myelopathy    Cervical disc disorder with radiculopathy of cervicothoracic region    Overweight (BMI 25.0-29.9)    Former smoker    Type 2 diabetes mellitus (HCC)    Paroxysmal atrial fibrillation (HCC)        Current Outpatient

## 2025-04-25 ENCOUNTER — HOSPITAL ENCOUNTER (OUTPATIENT)
Facility: HOSPITAL | Age: 65
Discharge: HOME | End: 2025-04-28
Attending: INTERNAL MEDICINE
Payer: COMMERCIAL

## 2025-04-25 ENCOUNTER — OFFICE VISIT (OUTPATIENT)
Dept: PRIMARY CARE CLINIC | Facility: CLINIC | Age: 65
End: 2025-04-25
Payer: COMMERCIAL

## 2025-04-25 VITALS
WEIGHT: 194.4 LBS | DIASTOLIC BLOOD PRESSURE: 71 MMHG | BODY MASS INDEX: 25.76 KG/M2 | TEMPERATURE: 97.5 F | OXYGEN SATURATION: 99 % | HEART RATE: 59 BPM | RESPIRATION RATE: 16 BRPM | SYSTOLIC BLOOD PRESSURE: 118 MMHG | HEIGHT: 73 IN

## 2025-04-25 DIAGNOSIS — G89.29 CHRONIC RIGHT-SIDED LOW BACK PAIN WITHOUT SCIATICA: ICD-10-CM

## 2025-04-25 DIAGNOSIS — M54.50 CHRONIC RIGHT-SIDED LOW BACK PAIN WITHOUT SCIATICA: ICD-10-CM

## 2025-04-25 DIAGNOSIS — M1A.0710 IDIOPATHIC CHRONIC GOUT OF RIGHT FOOT WITHOUT TOPHUS: ICD-10-CM

## 2025-04-25 DIAGNOSIS — R35.1 NOCTURIA MORE THAN TWICE PER NIGHT: ICD-10-CM

## 2025-04-25 DIAGNOSIS — E78.2 MIXED HYPERLIPIDEMIA: ICD-10-CM

## 2025-04-25 DIAGNOSIS — Z91.148 NON COMPLIANCE W MEDICATION REGIMEN: ICD-10-CM

## 2025-04-25 DIAGNOSIS — E11.9 TYPE 2 DIABETES MELLITUS WITHOUT COMPLICATION, WITHOUT LONG-TERM CURRENT USE OF INSULIN (HCC): Primary | ICD-10-CM

## 2025-04-25 DIAGNOSIS — I48.0 PAROXYSMAL ATRIAL FIBRILLATION (HCC): ICD-10-CM

## 2025-04-25 DIAGNOSIS — E11.9 TYPE 2 DIABETES MELLITUS WITHOUT COMPLICATION, WITHOUT LONG-TERM CURRENT USE OF INSULIN (HCC): ICD-10-CM

## 2025-04-25 PROCEDURE — 99214 OFFICE O/P EST MOD 30 MIN: CPT | Performed by: INTERNAL MEDICINE

## 2025-04-25 PROCEDURE — 72100 X-RAY EXAM L-S SPINE 2/3 VWS: CPT

## 2025-04-25 SDOH — ECONOMIC STABILITY: FOOD INSECURITY: WITHIN THE PAST 12 MONTHS, YOU WORRIED THAT YOUR FOOD WOULD RUN OUT BEFORE YOU GOT MONEY TO BUY MORE.: NEVER TRUE

## 2025-04-25 SDOH — ECONOMIC STABILITY: FOOD INSECURITY: WITHIN THE PAST 12 MONTHS, THE FOOD YOU BOUGHT JUST DIDN'T LAST AND YOU DIDN'T HAVE MONEY TO GET MORE.: NEVER TRUE

## 2025-04-25 ASSESSMENT — PATIENT HEALTH QUESTIONNAIRE - PHQ9
2. FEELING DOWN, DEPRESSED OR HOPELESS: NOT AT ALL
SUM OF ALL RESPONSES TO PHQ QUESTIONS 1-9: 0
1. LITTLE INTEREST OR PLEASURE IN DOING THINGS: NOT AT ALL

## 2025-04-25 ASSESSMENT — ENCOUNTER SYMPTOMS
DIARRHEA: 0
SORE THROAT: 0
RHINORRHEA: 0
COLOR CHANGE: 0
CHEST TIGHTNESS: 0
SHORTNESS OF BREATH: 0
BACK PAIN: 1
COUGH: 0
CONSTIPATION: 0
EYE DISCHARGE: 0
ABDOMINAL PAIN: 0

## 2025-04-25 NOTE — PROGRESS NOTES
Gomez Alex (:  1960) is a 64 y.o. male, Established patient, here for evaluation of the following chief complaint(s):  Back Pain (Has been getting worse in the past 2 weeks.)        ASSESSMENT/PLAN:  1. Type 2 diabetes mellitus without complication, without long-term current use of insulin (HCC)  -     CBC; Future  -     Comprehensive Metabolic Panel; Future  -     Albumin/Creatinine Ratio, Urine; Future  -     Hemoglobin A1C; Future  -     HM DIABETES FOOT EXAM  I performed a diabetic foot exam. I ordered a CBC and CMP. I ordered an albumin/creatinine urine ratio. I ordered blood work to measure Hgb A1C levels. Waiting for results.    2. Paroxysmal atrial fibrillation (HCC)  He has stopped taking Eliquis. Recommended seeing a cardiologist.       3. Idiopathic chronic gout of right foot without tophus  -     Uric Acid; Future  I ordered labs to measure uric acid levels. Waiting for results.    4. Mixed hyperlipidemia  -     Lipid Panel; Future  I ordered a lipid panel to assess cholesterol levels. Waiting for results.    5. Non compliance w medication regimen  I urged patient to adhere to medical advice. Will reevaluate medication regimen after lab results.     6. Nocturia more than twice per night  -     PSA, Diagnostic; Future  I ordered labs to measure PSA levels. Waiting for results.    7. Chronic right-sided low back pain without sciatica  -     XR LUMBAR SPINE (2-3 VIEWS); Future  Patient says symptoms have resolved. I ordered a lumbar XR to further assess his symptoms. Waiting for results.             Subjective   SUBJECTIVE/OBJECTIVE:  Back Pain  Pertinent negatives include no abdominal pain, dysuria or headaches.     Patient presents today for a diabetic follow-up.    He reports some right, lower back pain that has been worsening. He denies it radiating into his legs. He says the pain suddenly resolved.     He has been controlling his diabetes with diet. He is due for a diabetic check-up.     BP is

## 2025-04-25 NOTE — PROGRESS NOTES
Health Decision Maker has been checked with the patient          Chief Complaint   Patient presents with    Back Pain     Has been getting worse in the past 2 weeks.       \"Have you been to the ER, urgent care clinic since your last visit?  Hospitalized since your last visit?\"    NO    “Have you seen or consulted any other health care providers outside of [facility] since your last visit?”    NO      Vitals:    04/25/25 1429   Resp: 16   Temp: 97.5 °F (36.4 °C)   TempSrc: Temporal   Weight: 88.2 kg (194 lb 6.4 oz)   Height: 1.854 m (6' 1\")                 Click Here for Release of Records Request

## 2025-04-26 LAB
ALBUMIN SERPL-MCNC: 4.5 G/DL (ref 3.9–4.9)
ALP SERPL-CCNC: 60 IU/L (ref 44–121)
ALT SERPL-CCNC: 23 IU/L (ref 0–44)
AST SERPL-CCNC: 16 IU/L (ref 0–40)
BILIRUB SERPL-MCNC: 0.4 MG/DL (ref 0–1.2)
BUN SERPL-MCNC: 18 MG/DL (ref 8–27)
BUN/CREAT SERPL: 22 (ref 10–24)
CALCIUM SERPL-MCNC: 8.9 MG/DL (ref 8.6–10.2)
CHLORIDE SERPL-SCNC: 103 MMOL/L (ref 96–106)
CHOLEST SERPL-MCNC: 147 MG/DL (ref 100–199)
CO2 SERPL-SCNC: 25 MMOL/L (ref 20–29)
CREAT SERPL-MCNC: 0.83 MG/DL (ref 0.76–1.27)
EGFRCR SERPLBLD CKD-EPI 2021: 99 ML/MIN/1.73
ERYTHROCYTE [DISTWIDTH] IN BLOOD BY AUTOMATED COUNT: 12.7 % (ref 11.6–15.4)
GLOBULIN SER CALC-MCNC: 2.1 G/DL (ref 1.5–4.5)
GLUCOSE SERPL-MCNC: 76 MG/DL (ref 70–99)
HBA1C MFR BLD: 6.2 % (ref 4.8–5.6)
HCT VFR BLD AUTO: 44.1 % (ref 37.5–51)
HDLC SERPL-MCNC: 58 MG/DL
HGB BLD-MCNC: 13.9 G/DL (ref 13–17.7)
LDLC SERPL CALC-MCNC: 80 MG/DL (ref 0–99)
MCH RBC QN AUTO: 27.4 PG (ref 26.6–33)
MCHC RBC AUTO-ENTMCNC: 31.5 G/DL (ref 31.5–35.7)
MCV RBC AUTO: 87 FL (ref 79–97)
PLATELET # BLD AUTO: 269 X10E3/UL (ref 150–450)
POTASSIUM SERPL-SCNC: 4.3 MMOL/L (ref 3.5–5.2)
PROT SERPL-MCNC: 6.6 G/DL (ref 6–8.5)
PSA SERPL-MCNC: 1.3 NG/ML (ref 0–4)
RBC # BLD AUTO: 5.07 X10E6/UL (ref 4.14–5.8)
SODIUM SERPL-SCNC: 142 MMOL/L (ref 134–144)
TRIGL SERPL-MCNC: 35 MG/DL (ref 0–149)
URATE SERPL-MCNC: 6.1 MG/DL (ref 3.8–8.4)
VLDLC SERPL CALC-MCNC: 9 MG/DL (ref 5–40)
WBC # BLD AUTO: 7.8 X10E3/UL (ref 3.4–10.8)

## 2025-04-27 ENCOUNTER — RESULTS FOLLOW-UP (OUTPATIENT)
Dept: PRIMARY CARE CLINIC | Facility: CLINIC | Age: 65
End: 2025-04-27

## 2025-04-27 LAB
ALBUMIN/CREAT UR: <2 MG/G CREAT (ref 0–29)
CREAT UR-MCNC: 159.4 MG/DL
MICROALBUMIN UR-MCNC: <3 UG/ML

## 2025-05-01 ENCOUNTER — TELEPHONE (OUTPATIENT)
Dept: PRIMARY CARE CLINIC | Facility: CLINIC | Age: 65
End: 2025-05-01

## 2025-05-01 DIAGNOSIS — G89.29 CHRONIC MIDLINE LOW BACK PAIN WITHOUT SCIATICA: Primary | ICD-10-CM

## 2025-05-01 DIAGNOSIS — M54.50 CHRONIC MIDLINE LOW BACK PAIN WITHOUT SCIATICA: Primary | ICD-10-CM

## 2025-05-01 NOTE — TELEPHONE ENCOUNTER
Patient's wife called to get a referral for her  for PT.  She stated [facility].  She would like a call back to let her know if this would be possible at Ph# [phone].

## 2025-05-02 NOTE — TELEPHONE ENCOUNTER
Spoke to wife and told her new PT referral has been placed and I faxed it to that facility. She said ok, thank you.

## 2025-05-13 ENCOUNTER — HOSPITAL ENCOUNTER (OUTPATIENT)
Facility: HOSPITAL | Age: 65
Setting detail: RECURRING SERIES
Discharge: HOME | End: 2025-05-16
Attending: INTERNAL MEDICINE
Payer: COMMERCIAL

## 2025-05-13 PROCEDURE — 97110 THERAPEUTIC EXERCISES: CPT

## 2025-05-13 PROCEDURE — 97162 PT EVAL MOD COMPLEX 30 MIN: CPT

## 2025-05-13 NOTE — THERAPY EVALUATION
[facility]  [address]  [place] [address]  Phone: [phone]   Fax: [phone]        PHYSICAL THERAPY - MEDICARE EVALUATION/PLAN OF CARE NOTE (updated 3/23)      Date: 2025          Patient Name:  Gomez Alex :  1960   Medical   Diagnosis:  Chronic midline low back pain without sciatica [M54.50, G89.29] Treatment Diagnosis:  M54.59  OTHER LOWER BACK PAIN    Referral Source:  Simi To MD Provider #:  [id number]                Insurance: Payor: [facility] / Plan: [facility] [employer] / Product Type: *No Product type* /      Patient  verified yes     Visit #   Current  / Total 1 24   Time   In / Out 2:30 3:29   Total Treatment Time 59   Total Timed Codes 29   1:1 Treatment Time 29      [facility] Totals Reminder:  bill using total billable   min of TIMED therapeutic procedures and modalities.   8-22 min = 1 unit; 23-37 min = 2 units; 38-52 min = 3 units;  53-67 min = 4 units; 68-82 min = 5 units       SUBJECTIVE  If an interpreting service was utilized for treatment of this patient, the contents of this document represent the material reviewed with the patient via the .     Pain Level (0-10 scale): 4  []constant [x]intermittent []improving []worsening []no change since onset    Any medication changes, allergies to medications, adverse drug reactions, diagnosis change, or new procedure performed?: [x] No    [] Yes (see summary sheet for update)  Medications: Verified on Patient Summary List    Subjective functional status/changes:     Patient is a 64 year old presenting to therapy today with a chief complaint of chronic low back pain. Patient reports that he has been dealing with this pain off and on for at least 25-30 years, however he had an increase in pain about 3 weeks ago without known cause. Patient reports that when he has these flare ups happen he has extreme pain with any

## 2025-06-03 ENCOUNTER — HOSPITAL ENCOUNTER (OUTPATIENT)
Facility: HOSPITAL | Age: 65
Setting detail: RECURRING SERIES
Discharge: HOME | End: 2025-06-06
Attending: INTERNAL MEDICINE
Payer: COMMERCIAL

## 2025-06-03 PROCEDURE — 97110 THERAPEUTIC EXERCISES: CPT

## 2025-06-03 NOTE — PROGRESS NOTES
concurrently with other procedures   [x] Review HEP    [] Progressed/Changed HEP, detail:    [] Other detail:         Other Objective/Functional Measures  NA    Pain Level at end of session (0-10 scale): 2      Assessment   Patient presents today for their first follow up since the initial evaluation. Today's session focused on reviewing initial HEP, which patient tolerated well. Able to tolerate progressions and new exercises well, reporting muscle fatigue but no increase in pain. Plan to continue to progress as able.   Patient will continue to benefit from skilled PT / OT services to modify and progress therapeutic interventions, analyze and address functional mobility deficits, analyze and address ROM deficits, analyze and address strength deficits, analyze and address soft tissue restrictions, analyze and cue for proper movement patterns, analyze and modify for postural abnormalities, and analyze and address imbalance/dizziness to address functional deficits and attain remaining goals.    Progress toward goals / Updated goals:  []  See Progress Note/Recertification    Short Term Goals: To be accomplished in 8-10 treatments.  Patient will be independent with initial HEP in order to transition to general wellness program. - progressing  Patient will report worst pain level of 4/10 or better to allow for a minimum of 6 hours of interrupted sleeping ability.- progressing  The patient will demonstrate lumbar flexion AROM to 100% WNL to improve ease in reaching items off of a low shelf in home setting- progressing  The patient will demonstrate lumbar extension AROM to 75% or greater to improve ease with overhead reaching as needed for self care - progressing  Long Term Goals: To be accomplished in 20-24 treatments.  1.Patient will report worst pain no greater than 2/10 to increase QOL and allow for independence with all hygienic self-care and ADL skills - progressing  2.Patient will demonstrate full lumbar AROM WFL to

## 2025-06-10 ENCOUNTER — HOSPITAL ENCOUNTER (OUTPATIENT)
Facility: HOSPITAL | Age: 65
Setting detail: RECURRING SERIES
Discharge: HOME | End: 2025-06-13
Attending: INTERNAL MEDICINE
Payer: COMMERCIAL

## 2025-06-10 PROCEDURE — 97110 THERAPEUTIC EXERCISES: CPT

## 2025-06-10 NOTE — PROGRESS NOTES
[facility]  [address]  [place] [address]  Phone: [phone]   Fax: [phone]     PHYSICAL THERAPY PROGRESS NOTE  Patient Name:  Gomez Alex :  1960   Treatment/Medical Diagnosis: Other low back pain [M54.59]   Referral Source:  Simi To MD     Date of Initial Visit:  2025 Attended Visits:  3 Missed Visits:  0     SUMMARY OF TREATMENT/ASSESSMENT:  Patient is a 64 year old being seen for low back pain/SI joint dysfunction. Patient has been seen for 3 visits and has been treated using therapeutic exercise to make improvements with lumbar ROM, hip and core stability, pain levels, and overall functional mobility.      CURRENT STATUS/GOALS:  Since the initial evaluation patient demonstrates some minor improvements globally. Today his lumbar ROM improvements include left side bending to 75% WNL (vs 50%) WNL. His LE strength improvements include right hip ER to 4+/5 (vs 4/5) and right hip abduction to 4/5 (vs 4-/5). His single leg stability also improved to R= 49 seconds (vs 29 seconds) and L= 55 seconds (vs 27 seconds). His FOTO score improved to 63/100 (vs 61/100) and he has met 0/7 goals for discharge. Although patient does demonstrate some minor improvements, he has been globally limited by decreased attendance and decreased adherence to his HEP. This is the patient's second follow up since the initial evaluation, and he admits to not adhering to a structured HEP at home. Educated patient on the importance of following a structure HEP with progressions as we progress during the sessions in order to actually achieve results, which patient reports understanding. Plan to continue to progress hip and core stability as able in order to achieve remaining goals. Patient will continue to benefit from skilled PT / OT services to modify and progress therapeutic interventions, analyze and address functional 
will be independent with initial HEP in order to transition to general wellness program. - progressing  Patient will report worst pain level of 4/10 or better to allow for a minimum of 6 hours of interrupted sleeping ability.- progressing  The patient will demonstrate lumbar flexion AROM to 100% WNL to improve ease in reaching items off of a low shelf in home setting- progressing  The patient will demonstrate lumbar extension AROM to 75% or greater to improve ease with overhead reaching as needed for self care - progressing  Long Term Goals: To be accomplished in 20-24 treatments.  1.Patient will report worst pain no greater than 2/10 to increase QOL and allow for independence with all hygienic self-care and ADL skills - progressing  2.Patient will demonstrate full lumbar AROM WFL to allow for painting without pain. - progressing  3.Patient will demonstrate a FOTO score improvement by at least 7 points in order to meet the MDC and indicate improved QOL.- progressing    PLAN  YES Continue plan of care  Re-Cert Due: 8/11/2025  [x]  Upgrade activities as tolerated  []  Discharge due to:  []  Other:      Jayna Porter, PT       6/10/2025       9:34 AM

## 2025-06-17 ENCOUNTER — APPOINTMENT (OUTPATIENT)
Facility: HOSPITAL | Age: 65
End: 2025-06-17
Attending: INTERNAL MEDICINE
Payer: COMMERCIAL

## 2025-06-19 ENCOUNTER — HOSPITAL ENCOUNTER (OUTPATIENT)
Facility: HOSPITAL | Age: 65
Setting detail: RECURRING SERIES
Discharge: HOME | End: 2025-06-22
Attending: INTERNAL MEDICINE
Payer: COMMERCIAL

## 2025-06-19 PROCEDURE — 97110 THERAPEUTIC EXERCISES: CPT

## 2025-06-19 NOTE — PROGRESS NOTES
PHYSICAL THERAPY - MEDICARE DAILY TREATMENT NOTE (updated 3/23)      Date: 2025          Patient Name:  Gomez Alex :  1960   Medical   Diagnosis:  Other low back pain [M54.59] Treatment Diagnosis:  M54.59  OTHER LOWER BACK PAIN    Referral Source:  Simi To MD Insurance:   Payor: [facility] / Plan: [facility] [employer]EEPERS / Product Type: *No Product type* /                     Patient  verified yes     Visit #   Current  / Total 3 24   Time   In / Out 2:33 pm 3:08 pm   Total Treatment Time 35 mins   Total Timed Codes 35 mins   1:1 Treatment Time 35 mins      [facility] Totals Reminder:  bill using total billable   min of TIMED therapeutic procedures and modalities.   8-22 min = 1 unit; 23-37 min = 2 units; 38-52 min = 3 units; 53-67 min = 4 units; 68-82 min = 5 units          SUBJECTIVE  If an interpreting service was utilized for treatment of this patient, the contents of this document represent the material reviewed with the patient via the .     Pain Level (0-10 scale): 4/10    Any medication changes, allergies to medications, adverse drug reactions, diagnosis change, or new procedure performed?: [x] No    [] Yes (see summary sheet for update)  Medications: Verified on Patient Summary List    Subjective functional status/changes:     Patient reports feeling good today, reporting 4/10 pain in LB. Pt also reports falling last Friday, but denies an injuries. Pt states he had on his boots and slipped outside because of all the water and mud from the rain.     OBJECTIVE    Therapeutic Procedures:  Tx Min Billable or 1:1 Min (if diff from Tx Min) Procedure, Rationale, Specifics   35  [id number] Therapeutic Exercise (timed):  increase ROM, strength, coordination, balance, and proprioception to improve patient's ability to progress to PLOF and address remaining functional goals. (see flow sheet as applicable)     Details if applicable:  reviewed current HEP    35     Total Total     [x]  Patient

## 2025-06-24 ENCOUNTER — HOSPITAL ENCOUNTER (OUTPATIENT)
Facility: HOSPITAL | Age: 65
Setting detail: RECURRING SERIES
Discharge: HOME | End: 2025-06-27
Attending: INTERNAL MEDICINE
Payer: COMMERCIAL

## 2025-06-24 PROCEDURE — 97110 THERAPEUTIC EXERCISES: CPT

## 2025-06-24 NOTE — PROGRESS NOTES
PHYSICAL THERAPY - MEDICARE DAILY TREATMENT NOTE (updated 3/23)      Date: 2025          Patient Name:  Gomez Alex :  1960   Medical   Diagnosis:  Other low back pain [M54.59] Treatment Diagnosis:  M54.59  OTHER LOWER BACK PAIN    Referral Source:  Simi To MD Insurance:   Payor: [facility] / Plan: [facility] [employer]EEPERS / Product Type: *No Product type* /                     Patient  verified yes     Visit #   Current  / Total 5 24   Time   In / Out 4:25 pm 5:09 pm   Total Treatment Time 44   Total Timed Codes 44   1:1 Treatment Time 44       [facility] Totals Reminder:  bill using total billable   min of TIMED therapeutic procedures and modalities.   8-22 min = 1 unit; 23-37 min = 2 units; 38-52 min = 3 units; 53-67 min = 4 units; 68-82 min = 5 units          SUBJECTIVE  If an interpreting service was utilized for treatment of this patient, the contents of this document represent the material reviewed with the patient via the .     Pain Level (0-10 scale): 2/10    Any medication changes, allergies to medications, adverse drug reactions, diagnosis change, or new procedure performed?: [x] No    [] Yes (see summary sheet for update)  Medications: Verified on Patient Summary List    Subjective functional status/changes:     Patient reports that the last 2 sessions he has felt more tired after, but not any pain.      OBJECTIVE    Therapeutic Procedures:  Tx Min Billable or 1:1 Min (if diff from Tx Min) Procedure, Rationale, Specifics   44 44 [id number] Therapeutic Exercise (timed):  increase ROM, strength, coordination, balance, and proprioception to improve patient's ability to progress to PLOF and address remaining functional goals. (see flow sheet as applicable)     Details if applicable:  reviewed current HEP    44 44    Total Total     [x]  Patient Education billed concurrently with other procedures   [x] Review HEP    [] Progressed/Changed HEP, detail:    [] Other detail:         Other

## 2025-09-06 ENCOUNTER — HOSPITAL ENCOUNTER (EMERGENCY)
Facility: HOSPITAL | Age: 65
Discharge: HOME | End: 2025-09-06
Attending: EMERGENCY MEDICINE
Payer: COMMERCIAL

## 2025-09-06 ENCOUNTER — APPOINTMENT (OUTPATIENT)
Facility: HOSPITAL | Age: 65
End: 2025-09-06
Payer: COMMERCIAL

## 2025-09-06 VITALS
RESPIRATION RATE: 12 BRPM | TEMPERATURE: 98 F | BODY MASS INDEX: 24.25 KG/M2 | WEIGHT: 182.98 LBS | HEART RATE: 54 BPM | OXYGEN SATURATION: 97 % | DIASTOLIC BLOOD PRESSURE: 74 MMHG | HEIGHT: 73 IN | SYSTOLIC BLOOD PRESSURE: 145 MMHG

## 2025-09-06 DIAGNOSIS — R42 VERTIGO: Primary | ICD-10-CM

## 2025-09-06 LAB
ALBUMIN SERPL-MCNC: 4.4 G/DL (ref 3.5–5.2)
ALBUMIN/GLOB SERPL: 1.4 (ref 1.1–2.2)
ALP SERPL-CCNC: 51 U/L (ref 40–129)
ALT SERPL-CCNC: 25 U/L (ref 10–50)
ANION GAP SERPL CALC-SCNC: 11 MMOL/L (ref 2–14)
AST SERPL-CCNC: 19 U/L (ref 10–50)
BASOPHILS # BLD: 0.03 K/UL (ref 0–0.1)
BASOPHILS NFR BLD: 0.5 % (ref 0–1)
BILIRUB SERPL-MCNC: 0.5 MG/DL (ref 0–1.2)
BUN SERPL-MCNC: 14 MG/DL (ref 8–23)
BUN/CREAT SERPL: 17 (ref 12–20)
CALCIUM SERPL-MCNC: 9.2 MG/DL (ref 8.8–10.2)
CHLORIDE SERPL-SCNC: 102 MMOL/L (ref 98–107)
CO2 SERPL-SCNC: 27 MMOL/L (ref 20–29)
CREAT SERPL-MCNC: 0.81 MG/DL (ref 0.7–1.2)
DIFFERENTIAL METHOD BLD: NORMAL
EKG ATRIAL RATE: 64 BPM
EKG DIAGNOSIS: NORMAL
EKG P AXIS: 74 DEGREES
EKG P-R INTERVAL: 138 MS
EKG Q-T INTERVAL: 422 MS
EKG QRS DURATION: 108 MS
EKG QTC CALCULATION (BAZETT): 435 MS
EKG R AXIS: -25 DEGREES
EKG T AXIS: 70 DEGREES
EKG VENTRICULAR RATE: 64 BPM
EOSINOPHIL # BLD: 0.1 K/UL (ref 0–0.4)
EOSINOPHIL NFR BLD: 1.6 % (ref 0–7)
ERYTHROCYTE [DISTWIDTH] IN BLOOD BY AUTOMATED COUNT: 12.4 % (ref 11.5–14.5)
GLOBULIN SER CALC-MCNC: 3.1 G/DL (ref 2–4)
GLUCOSE SERPL-MCNC: 95 MG/DL (ref 65–100)
HCT VFR BLD AUTO: 45.4 % (ref 36.6–50.3)
HGB BLD-MCNC: 14.7 G/DL (ref 12.1–17)
IMM GRANULOCYTES # BLD AUTO: 0.01 K/UL (ref 0–0.04)
IMM GRANULOCYTES NFR BLD AUTO: 0.2 % (ref 0–0.5)
LYMPHOCYTES # BLD: 1.94 K/UL (ref 0.8–3.5)
LYMPHOCYTES NFR BLD: 31.1 % (ref 12–49)
MCH RBC QN AUTO: 28.1 PG (ref 26–34)
MCHC RBC AUTO-ENTMCNC: 32.4 G/DL (ref 30–36.5)
MCV RBC AUTO: 86.6 FL (ref 80–99)
MONOCYTES # BLD: 0.63 K/UL (ref 0–1)
MONOCYTES NFR BLD: 10.1 % (ref 5–13)
NEUTS SEG # BLD: 3.53 K/UL (ref 1.8–8)
NEUTS SEG NFR BLD: 56.5 % (ref 32–75)
NRBC # BLD: 0 K/UL (ref 0–0.01)
NRBC BLD-RTO: 0 PER 100 WBC
PLATELET # BLD AUTO: 269 K/UL (ref 150–400)
PMV BLD AUTO: 9.8 FL (ref 8.9–12.9)
POTASSIUM SERPL-SCNC: 4.4 MMOL/L (ref 3.5–5.1)
PROT SERPL-MCNC: 7.5 G/DL (ref 6.4–8.3)
RBC # BLD AUTO: 5.24 M/UL (ref 4.1–5.7)
SODIUM SERPL-SCNC: 140 MMOL/L (ref 136–145)
TROPONIN T SERPL HS-MCNC: 7.2 NG/L (ref 0–22)
WBC # BLD AUTO: 6.2 K/UL (ref 4.1–11.1)

## 2025-09-06 PROCEDURE — 93005 ELECTROCARDIOGRAM TRACING: CPT | Performed by: EMERGENCY MEDICINE

## 2025-09-06 PROCEDURE — 6360000004 HC RX CONTRAST MEDICATION: Performed by: RADIOLOGY

## 2025-09-06 PROCEDURE — 84484 ASSAY OF TROPONIN QUANT: CPT

## 2025-09-06 PROCEDURE — 70498 CT ANGIOGRAPHY NECK: CPT

## 2025-09-06 PROCEDURE — 6370000000 HC RX 637 (ALT 250 FOR IP): Performed by: EMERGENCY MEDICINE

## 2025-09-06 PROCEDURE — 36415 COLL VENOUS BLD VENIPUNCTURE: CPT

## 2025-09-06 PROCEDURE — 70450 CT HEAD/BRAIN W/O DYE: CPT

## 2025-09-06 PROCEDURE — 85025 COMPLETE CBC W/AUTO DIFF WBC: CPT

## 2025-09-06 PROCEDURE — 80053 COMPREHEN METABOLIC PANEL: CPT

## 2025-09-06 PROCEDURE — 2580000003 HC RX 258: Performed by: EMERGENCY MEDICINE

## 2025-09-06 RX ORDER — MECLIZINE HYDROCHLORIDE 25 MG/1
25 TABLET ORAL 3 TIMES DAILY PRN
Qty: 25 TABLET | Refills: 0 | Status: SHIPPED | OUTPATIENT
Start: 2025-09-06 | End: 2025-09-16

## 2025-09-06 RX ORDER — IOPAMIDOL 755 MG/ML
100 INJECTION, SOLUTION INTRAVASCULAR
Status: COMPLETED | OUTPATIENT
Start: 2025-09-06 | End: 2025-09-06

## 2025-09-06 RX ORDER — SODIUM CHLORIDE, SODIUM LACTATE, POTASSIUM CHLORIDE, AND CALCIUM CHLORIDE .6; .31; .03; .02 G/100ML; G/100ML; G/100ML; G/100ML
1000 INJECTION, SOLUTION INTRAVENOUS ONCE
Status: COMPLETED | OUTPATIENT
Start: 2025-09-06 | End: 2025-09-06

## 2025-09-06 RX ORDER — MECLIZINE HCL 12.5 MG 12.5 MG/1
25 TABLET ORAL ONCE
Status: COMPLETED | OUTPATIENT
Start: 2025-09-06 | End: 2025-09-06

## 2025-09-06 RX ADMIN — SODIUM CHLORIDE, SODIUM LACTATE, POTASSIUM CHLORIDE, AND CALCIUM CHLORIDE 1000 ML: .6; .31; .03; .02 INJECTION, SOLUTION INTRAVENOUS at 13:50

## 2025-09-06 RX ADMIN — MECLIZINE 25 MG: 12.5 TABLET ORAL at 13:50

## 2025-09-06 RX ADMIN — IOPAMIDOL 100 ML: 755 INJECTION, SOLUTION INTRAVENOUS at 14:09

## 2025-09-06 ASSESSMENT — LIFESTYLE VARIABLES
HOW MANY STANDARD DRINKS CONTAINING ALCOHOL DO YOU HAVE ON A TYPICAL DAY: PATIENT DOES NOT DRINK
HOW OFTEN DO YOU HAVE A DRINK CONTAINING ALCOHOL: NEVER

## 2025-09-06 ASSESSMENT — PAIN SCALES - GENERAL: PAINLEVEL_OUTOF10: 0
